# Patient Record
Sex: FEMALE | Race: WHITE | Employment: UNEMPLOYED | URBAN - METROPOLITAN AREA
[De-identification: names, ages, dates, MRNs, and addresses within clinical notes are randomized per-mention and may not be internally consistent; named-entity substitution may affect disease eponyms.]

---

## 2019-11-06 ENCOUNTER — APPOINTMENT (OUTPATIENT)
Dept: CT IMAGING | Age: 44
DRG: 392 | End: 2019-11-06
Payer: COMMERCIAL

## 2019-11-06 ENCOUNTER — HOSPITAL ENCOUNTER (INPATIENT)
Age: 44
LOS: 3 days | Discharge: HOME | DRG: 392 | End: 2019-11-09
Attending: EMERGENCY MEDICINE | Admitting: FAMILY MEDICINE
Payer: COMMERCIAL

## 2019-11-06 DIAGNOSIS — K57.92 DIVERTICULITIS: ICD-10-CM

## 2019-11-06 DIAGNOSIS — K57.32 DIVERTICULITIS OF COLON: Primary | ICD-10-CM

## 2019-11-06 LAB
ABSOLUTE EOS #: 0.06 K/UL (ref 0–0.44)
ABSOLUTE IMMATURE GRANULOCYTE: 0.05 K/UL (ref 0–0.3)
ABSOLUTE LYMPH #: 2.29 K/UL (ref 1.1–3.7)
ABSOLUTE MONO #: 1.03 K/UL (ref 0.1–1.2)
ALBUMIN SERPL-MCNC: 4 G/DL (ref 3.5–5.2)
ALBUMIN/GLOBULIN RATIO: 1.2 (ref 1–2.5)
ALP BLD-CCNC: 74 U/L (ref 35–104)
ALT SERPL-CCNC: 13 U/L (ref 5–33)
ANION GAP SERPL CALCULATED.3IONS-SCNC: 13 MMOL/L (ref 9–17)
AST SERPL-CCNC: 20 U/L
BASOPHILS # BLD: 1 % (ref 0–2)
BASOPHILS ABSOLUTE: 0.06 K/UL (ref 0–0.2)
BILIRUB SERPL-MCNC: 1.12 MG/DL (ref 0.3–1.2)
BILIRUBIN DIRECT: 0.29 MG/DL
BILIRUBIN URINE: NEGATIVE
BILIRUBIN, INDIRECT: 0.83 MG/DL (ref 0–1)
BUN BLDV-MCNC: 7 MG/DL (ref 6–20)
BUN/CREAT BLD: ABNORMAL (ref 9–20)
CALCIUM SERPL-MCNC: 9.3 MG/DL (ref 8.6–10.4)
CHLORIDE BLD-SCNC: 106 MMOL/L (ref 98–107)
CO2: 18 MMOL/L (ref 20–31)
COLOR: YELLOW
COMMENT UA: ABNORMAL
CREAT SERPL-MCNC: 0.58 MG/DL (ref 0.5–0.9)
DIFFERENTIAL TYPE: ABNORMAL
EOSINOPHILS RELATIVE PERCENT: 1 % (ref 1–4)
GFR AFRICAN AMERICAN: >60 ML/MIN
GFR NON-AFRICAN AMERICAN: >60 ML/MIN
GFR SERPL CREATININE-BSD FRML MDRD: ABNORMAL ML/MIN/{1.73_M2}
GFR SERPL CREATININE-BSD FRML MDRD: ABNORMAL ML/MIN/{1.73_M2}
GLOBULIN: NORMAL G/DL (ref 1.5–3.8)
GLUCOSE BLD-MCNC: 108 MG/DL (ref 70–99)
GLUCOSE URINE: NEGATIVE
HCG QUALITATIVE: NEGATIVE
HCT VFR BLD CALC: 42 % (ref 36.3–47.1)
HEMOGLOBIN: 14 G/DL (ref 11.9–15.1)
IMMATURE GRANULOCYTES: 0 %
KETONES, URINE: NEGATIVE
LEUKOCYTE ESTERASE, URINE: NEGATIVE
LIPASE: 13 U/L (ref 13–60)
LYMPHOCYTES # BLD: 18 % (ref 24–43)
MCH RBC QN AUTO: 30.6 PG (ref 25.2–33.5)
MCHC RBC AUTO-ENTMCNC: 33.3 G/DL (ref 28.4–34.8)
MCV RBC AUTO: 91.7 FL (ref 82.6–102.9)
MONOCYTES # BLD: 8 % (ref 3–12)
NITRITE, URINE: NEGATIVE
NRBC AUTOMATED: 0 PER 100 WBC
PDW BLD-RTO: 12.9 % (ref 11.8–14.4)
PH UA: 6.5 (ref 5–8)
PLATELET # BLD: 265 K/UL (ref 138–453)
PLATELET ESTIMATE: ABNORMAL
PMV BLD AUTO: 9.9 FL (ref 8.1–13.5)
POTASSIUM SERPL-SCNC: 3.6 MMOL/L (ref 3.7–5.3)
PROTEIN UA: NEGATIVE
RBC # BLD: 4.58 M/UL (ref 3.95–5.11)
RBC # BLD: ABNORMAL 10*6/UL
SEG NEUTROPHILS: 73 % (ref 36–65)
SEGMENTED NEUTROPHILS ABSOLUTE COUNT: 9.39 K/UL (ref 1.5–8.1)
SODIUM BLD-SCNC: 137 MMOL/L (ref 135–144)
SPECIFIC GRAVITY UA: 1 (ref 1–1.03)
TOTAL PROTEIN: 7.4 G/DL (ref 6.4–8.3)
TURBIDITY: CLEAR
URINE HGB: NEGATIVE
UROBILINOGEN, URINE: NORMAL
WBC # BLD: 12.9 K/UL (ref 3.5–11.3)
WBC # BLD: ABNORMAL 10*3/UL

## 2019-11-06 PROCEDURE — 81003 URINALYSIS AUTO W/O SCOPE: CPT

## 2019-11-06 PROCEDURE — 2580000003 HC RX 258: Performed by: STUDENT IN AN ORGANIZED HEALTH CARE EDUCATION/TRAINING PROGRAM

## 2019-11-06 PROCEDURE — 80076 HEPATIC FUNCTION PANEL: CPT

## 2019-11-06 PROCEDURE — 96375 TX/PRO/DX INJ NEW DRUG ADDON: CPT

## 2019-11-06 PROCEDURE — 6360000002 HC RX W HCPCS: Performed by: EMERGENCY MEDICINE

## 2019-11-06 PROCEDURE — 99222 1ST HOSP IP/OBS MODERATE 55: CPT | Performed by: FAMILY MEDICINE

## 2019-11-06 PROCEDURE — 6360000004 HC RX CONTRAST MEDICATION: Performed by: EMERGENCY MEDICINE

## 2019-11-06 PROCEDURE — 96365 THER/PROPH/DIAG IV INF INIT: CPT

## 2019-11-06 PROCEDURE — 6360000002 HC RX W HCPCS: Performed by: CLINICAL NURSE SPECIALIST

## 2019-11-06 PROCEDURE — 84703 CHORIONIC GONADOTROPIN ASSAY: CPT

## 2019-11-06 PROCEDURE — 2500000003 HC RX 250 WO HCPCS: Performed by: STUDENT IN AN ORGANIZED HEALTH CARE EDUCATION/TRAINING PROGRAM

## 2019-11-06 PROCEDURE — 74177 CT ABD & PELVIS W/CONTRAST: CPT

## 2019-11-06 PROCEDURE — 6360000002 HC RX W HCPCS: Performed by: STUDENT IN AN ORGANIZED HEALTH CARE EDUCATION/TRAINING PROGRAM

## 2019-11-06 PROCEDURE — 2580000003 HC RX 258: Performed by: EMERGENCY MEDICINE

## 2019-11-06 PROCEDURE — 83690 ASSAY OF LIPASE: CPT

## 2019-11-06 PROCEDURE — G0378 HOSPITAL OBSERVATION PER HR: HCPCS

## 2019-11-06 PROCEDURE — 99285 EMERGENCY DEPT VISIT HI MDM: CPT

## 2019-11-06 PROCEDURE — 85025 COMPLETE CBC W/AUTO DIFF WBC: CPT

## 2019-11-06 PROCEDURE — 2580000003 HC RX 258: Performed by: CLINICAL NURSE SPECIALIST

## 2019-11-06 PROCEDURE — 1200000000 HC SEMI PRIVATE

## 2019-11-06 PROCEDURE — 96376 TX/PRO/DX INJ SAME DRUG ADON: CPT

## 2019-11-06 PROCEDURE — 96367 TX/PROPH/DG ADDL SEQ IV INF: CPT

## 2019-11-06 PROCEDURE — 80048 BASIC METABOLIC PNL TOTAL CA: CPT

## 2019-11-06 RX ORDER — POTASSIUM CHLORIDE 20 MEQ/1
40 TABLET, EXTENDED RELEASE ORAL PRN
Status: DISCONTINUED | OUTPATIENT
Start: 2019-11-06 | End: 2019-11-09 | Stop reason: HOSPADM

## 2019-11-06 RX ORDER — MAGNESIUM SULFATE 1 G/100ML
1 INJECTION INTRAVENOUS PRN
Status: DISCONTINUED | OUTPATIENT
Start: 2019-11-06 | End: 2019-11-09 | Stop reason: HOSPADM

## 2019-11-06 RX ORDER — ACETAMINOPHEN 325 MG/1
650 TABLET ORAL EVERY 4 HOURS PRN
Status: DISCONTINUED | OUTPATIENT
Start: 2019-11-06 | End: 2019-11-09 | Stop reason: HOSPADM

## 2019-11-06 RX ORDER — MORPHINE SULFATE 4 MG/ML
4 INJECTION, SOLUTION INTRAMUSCULAR; INTRAVENOUS ONCE
Status: COMPLETED | OUTPATIENT
Start: 2019-11-06 | End: 2019-11-06

## 2019-11-06 RX ORDER — SODIUM CHLORIDE 0.9 % (FLUSH) 0.9 %
10 SYRINGE (ML) INJECTION PRN
Status: DISCONTINUED | OUTPATIENT
Start: 2019-11-06 | End: 2019-11-09 | Stop reason: HOSPADM

## 2019-11-06 RX ORDER — LEVOTHYROXINE AND LIOTHYRONINE 38; 9 UG/1; UG/1
60 TABLET ORAL DAILY
Status: DISCONTINUED | OUTPATIENT
Start: 2019-11-06 | End: 2019-11-09 | Stop reason: HOSPADM

## 2019-11-06 RX ORDER — METRONIDAZOLE 500 MG/1
500 TABLET ORAL ONCE
Status: DISCONTINUED | OUTPATIENT
Start: 2019-11-06 | End: 2019-11-06

## 2019-11-06 RX ORDER — 0.9 % SODIUM CHLORIDE 0.9 %
1000 INTRAVENOUS SOLUTION INTRAVENOUS ONCE
Status: COMPLETED | OUTPATIENT
Start: 2019-11-06 | End: 2019-11-06

## 2019-11-06 RX ORDER — SODIUM CHLORIDE 0.9 % (FLUSH) 0.9 %
10 SYRINGE (ML) INJECTION EVERY 12 HOURS SCHEDULED
Status: DISCONTINUED | OUTPATIENT
Start: 2019-11-06 | End: 2019-11-09 | Stop reason: HOSPADM

## 2019-11-06 RX ORDER — POTASSIUM CHLORIDE 7.45 MG/ML
10 INJECTION INTRAVENOUS PRN
Status: DISCONTINUED | OUTPATIENT
Start: 2019-11-06 | End: 2019-11-09 | Stop reason: HOSPADM

## 2019-11-06 RX ORDER — ONDANSETRON 2 MG/ML
4 INJECTION INTRAMUSCULAR; INTRAVENOUS ONCE
Status: COMPLETED | OUTPATIENT
Start: 2019-11-06 | End: 2019-11-06

## 2019-11-06 RX ORDER — SODIUM CHLORIDE 9 MG/ML
INJECTION, SOLUTION INTRAVENOUS CONTINUOUS
Status: DISCONTINUED | OUTPATIENT
Start: 2019-11-06 | End: 2019-11-09 | Stop reason: HOSPADM

## 2019-11-06 RX ORDER — ONDANSETRON 2 MG/ML
4 INJECTION INTRAMUSCULAR; INTRAVENOUS EVERY 6 HOURS PRN
Status: DISCONTINUED | OUTPATIENT
Start: 2019-11-06 | End: 2019-11-09 | Stop reason: HOSPADM

## 2019-11-06 RX ORDER — HYDROCODONE BITARTRATE AND ACETAMINOPHEN 5; 325 MG/1; MG/1
1 TABLET ORAL EVERY 4 HOURS PRN
Status: DISCONTINUED | OUTPATIENT
Start: 2019-11-06 | End: 2019-11-09 | Stop reason: HOSPADM

## 2019-11-06 RX ORDER — NICOTINE 21 MG/24HR
1 PATCH, TRANSDERMAL 24 HOURS TRANSDERMAL DAILY PRN
Status: DISCONTINUED | OUTPATIENT
Start: 2019-11-06 | End: 2019-11-09 | Stop reason: HOSPADM

## 2019-11-06 RX ORDER — MORPHINE SULFATE 2 MG/ML
2 INJECTION, SOLUTION INTRAMUSCULAR; INTRAVENOUS
Status: DISCONTINUED | OUTPATIENT
Start: 2019-11-06 | End: 2019-11-09 | Stop reason: HOSPADM

## 2019-11-06 RX ORDER — MORPHINE SULFATE 4 MG/ML
4 INJECTION, SOLUTION INTRAMUSCULAR; INTRAVENOUS
Status: DISCONTINUED | OUTPATIENT
Start: 2019-11-06 | End: 2019-11-09 | Stop reason: HOSPADM

## 2019-11-06 RX ORDER — HYDROCODONE BITARTRATE AND ACETAMINOPHEN 5; 325 MG/1; MG/1
2 TABLET ORAL EVERY 4 HOURS PRN
Status: DISCONTINUED | OUTPATIENT
Start: 2019-11-06 | End: 2019-11-09 | Stop reason: HOSPADM

## 2019-11-06 RX ORDER — KETOROLAC TROMETHAMINE 15 MG/ML
15 INJECTION, SOLUTION INTRAMUSCULAR; INTRAVENOUS ONCE
Status: COMPLETED | OUTPATIENT
Start: 2019-11-06 | End: 2019-11-06

## 2019-11-06 RX ORDER — LEVOTHYROXINE AND LIOTHYRONINE 38; 9 UG/1; UG/1
60 TABLET ORAL DAILY
Status: ON HOLD | COMMUNITY
End: 2019-11-06

## 2019-11-06 RX ADMIN — ONDANSETRON 4 MG: 2 INJECTION INTRAMUSCULAR; INTRAVENOUS at 11:21

## 2019-11-06 RX ADMIN — MORPHINE SULFATE 4 MG: 4 INJECTION, SOLUTION INTRAMUSCULAR; INTRAVENOUS at 18:38

## 2019-11-06 RX ADMIN — MORPHINE SULFATE 4 MG: 4 INJECTION, SOLUTION INTRAMUSCULAR; INTRAVENOUS at 21:21

## 2019-11-06 RX ADMIN — METRONIDAZOLE 500 MG: 500 INJECTION, SOLUTION INTRAVENOUS at 17:00

## 2019-11-06 RX ADMIN — SODIUM CHLORIDE 1000 ML: 9 INJECTION, SOLUTION INTRAVENOUS at 11:21

## 2019-11-06 RX ADMIN — MORPHINE SULFATE 4 MG: 4 INJECTION INTRAVENOUS at 15:11

## 2019-11-06 RX ADMIN — MORPHINE SULFATE 4 MG: 4 INJECTION INTRAVENOUS at 11:21

## 2019-11-06 RX ADMIN — MORPHINE SULFATE 4 MG: 4 INJECTION, SOLUTION INTRAMUSCULAR; INTRAVENOUS at 23:31

## 2019-11-06 RX ADMIN — IOHEXOL 75 ML: 350 INJECTION, SOLUTION INTRAVENOUS at 14:32

## 2019-11-06 RX ADMIN — AMPICILLIN SODIUM AND SULBACTAM SODIUM 1.5 G: 1; .5 INJECTION, POWDER, FOR SOLUTION INTRAMUSCULAR; INTRAVENOUS at 16:43

## 2019-11-06 RX ADMIN — KETOROLAC TROMETHAMINE 15 MG: 15 INJECTION, SOLUTION INTRAMUSCULAR; INTRAVENOUS at 15:11

## 2019-11-06 RX ADMIN — ONDANSETRON 4 MG: 2 INJECTION INTRAMUSCULAR; INTRAVENOUS at 15:25

## 2019-11-06 RX ADMIN — SODIUM CHLORIDE: 9 INJECTION, SOLUTION INTRAVENOUS at 18:38

## 2019-11-06 RX ADMIN — SODIUM CHLORIDE: 9 INJECTION, SOLUTION INTRAVENOUS at 23:10

## 2019-11-06 RX ADMIN — MORPHINE SULFATE 4 MG: 4 INJECTION INTRAVENOUS at 13:28

## 2019-11-06 ASSESSMENT — ENCOUNTER SYMPTOMS
EYE PAIN: 0
RHINORRHEA: 0
COLOR CHANGE: 0
EYE DISCHARGE: 0
BACK PAIN: 0
DIARRHEA: 0
COUGH: 0
SHORTNESS OF BREATH: 0
NAUSEA: 1
VOMITING: 1
SORE THROAT: 0
ABDOMINAL PAIN: 1

## 2019-11-06 ASSESSMENT — PAIN DESCRIPTION - DESCRIPTORS
DESCRIPTORS: DISCOMFORT

## 2019-11-06 ASSESSMENT — PAIN DESCRIPTION - ORIENTATION
ORIENTATION: LEFT;LOWER

## 2019-11-06 ASSESSMENT — PAIN DESCRIPTION - ONSET
ONSET: ON-GOING
ONSET: ON-GOING

## 2019-11-06 ASSESSMENT — PAIN SCALES - GENERAL
PAINLEVEL_OUTOF10: 4
PAINLEVEL_OUTOF10: 8
PAINLEVEL_OUTOF10: 7
PAINLEVEL_OUTOF10: 8
PAINLEVEL_OUTOF10: 7
PAINLEVEL_OUTOF10: 8
PAINLEVEL_OUTOF10: 8
PAINLEVEL_OUTOF10: 2

## 2019-11-06 ASSESSMENT — PAIN DESCRIPTION - PAIN TYPE
TYPE: ACUTE PAIN

## 2019-11-06 ASSESSMENT — PAIN DESCRIPTION - PROGRESSION
CLINICAL_PROGRESSION: NOT CHANGED
CLINICAL_PROGRESSION: NOT CHANGED

## 2019-11-06 ASSESSMENT — PAIN DESCRIPTION - LOCATION
LOCATION: ABDOMEN

## 2019-11-06 ASSESSMENT — PAIN - FUNCTIONAL ASSESSMENT
PAIN_FUNCTIONAL_ASSESSMENT: ACTIVITIES ARE NOT PREVENTED
PAIN_FUNCTIONAL_ASSESSMENT: ACTIVITIES ARE NOT PREVENTED

## 2019-11-06 ASSESSMENT — PAIN DESCRIPTION - FREQUENCY
FREQUENCY: CONTINUOUS
FREQUENCY: CONTINUOUS

## 2019-11-07 LAB
ALBUMIN SERPL-MCNC: 3.2 G/DL (ref 3.5–5.2)
ALBUMIN/GLOBULIN RATIO: 1.1 (ref 1–2.5)
ALP BLD-CCNC: 53 U/L (ref 35–104)
ALT SERPL-CCNC: 12 U/L (ref 5–33)
ANION GAP SERPL CALCULATED.3IONS-SCNC: 11 MMOL/L (ref 9–17)
AST SERPL-CCNC: 24 U/L
BILIRUB SERPL-MCNC: 1.12 MG/DL (ref 0.3–1.2)
BUN BLDV-MCNC: 5 MG/DL (ref 6–20)
BUN/CREAT BLD: ABNORMAL (ref 9–20)
CALCIUM SERPL-MCNC: 8.4 MG/DL (ref 8.6–10.4)
CHLORIDE BLD-SCNC: 109 MMOL/L (ref 98–107)
CO2: 19 MMOL/L (ref 20–31)
CREAT SERPL-MCNC: 0.55 MG/DL (ref 0.5–0.9)
GFR AFRICAN AMERICAN: >60 ML/MIN
GFR NON-AFRICAN AMERICAN: >60 ML/MIN
GFR SERPL CREATININE-BSD FRML MDRD: ABNORMAL ML/MIN/{1.73_M2}
GFR SERPL CREATININE-BSD FRML MDRD: ABNORMAL ML/MIN/{1.73_M2}
GLUCOSE BLD-MCNC: 94 MG/DL (ref 70–99)
HCT VFR BLD CALC: 37.7 % (ref 36.3–47.1)
HEMOGLOBIN: 11.8 G/DL (ref 11.9–15.1)
INR BLD: 1.1
MCH RBC QN AUTO: 30.7 PG (ref 25.2–33.5)
MCHC RBC AUTO-ENTMCNC: 31.3 G/DL (ref 28.4–34.8)
MCV RBC AUTO: 98.2 FL (ref 82.6–102.9)
NRBC AUTOMATED: 0 PER 100 WBC
PDW BLD-RTO: 13.2 % (ref 11.8–14.4)
PLATELET # BLD: 181 K/UL (ref 138–453)
PMV BLD AUTO: 9.9 FL (ref 8.1–13.5)
POTASSIUM SERPL-SCNC: 4.1 MMOL/L (ref 3.7–5.3)
PROTHROMBIN TIME: 11.1 SEC (ref 9–12)
RBC # BLD: 3.84 M/UL (ref 3.95–5.11)
SODIUM BLD-SCNC: 139 MMOL/L (ref 135–144)
TOTAL PROTEIN: 6.2 G/DL (ref 6.4–8.3)
WBC # BLD: 9.9 K/UL (ref 3.5–11.3)

## 2019-11-07 PROCEDURE — G0378 HOSPITAL OBSERVATION PER HR: HCPCS

## 2019-11-07 PROCEDURE — 85610 PROTHROMBIN TIME: CPT

## 2019-11-07 PROCEDURE — 36415 COLL VENOUS BLD VENIPUNCTURE: CPT

## 2019-11-07 PROCEDURE — 6360000002 HC RX W HCPCS: Performed by: INTERNAL MEDICINE

## 2019-11-07 PROCEDURE — 85027 COMPLETE CBC AUTOMATED: CPT

## 2019-11-07 PROCEDURE — 80053 COMPREHEN METABOLIC PANEL: CPT

## 2019-11-07 PROCEDURE — 1200000000 HC SEMI PRIVATE

## 2019-11-07 PROCEDURE — 96367 TX/PROPH/DG ADDL SEQ IV INF: CPT

## 2019-11-07 PROCEDURE — 2500000003 HC RX 250 WO HCPCS: Performed by: INTERNAL MEDICINE

## 2019-11-07 PROCEDURE — 6360000002 HC RX W HCPCS: Performed by: CLINICAL NURSE SPECIALIST

## 2019-11-07 PROCEDURE — 99232 SBSQ HOSP IP/OBS MODERATE 35: CPT | Performed by: INTERNAL MEDICINE

## 2019-11-07 PROCEDURE — 96366 THER/PROPH/DIAG IV INF ADDON: CPT

## 2019-11-07 PROCEDURE — 96376 TX/PRO/DX INJ SAME DRUG ADON: CPT

## 2019-11-07 PROCEDURE — 2580000003 HC RX 258: Performed by: CLINICAL NURSE SPECIALIST

## 2019-11-07 RX ORDER — CIPROFLOXACIN 2 MG/ML
400 INJECTION, SOLUTION INTRAVENOUS EVERY 12 HOURS
Status: DISCONTINUED | OUTPATIENT
Start: 2019-11-07 | End: 2019-11-09 | Stop reason: HOSPADM

## 2019-11-07 RX ADMIN — MORPHINE SULFATE 4 MG: 4 INJECTION, SOLUTION INTRAMUSCULAR; INTRAVENOUS at 15:22

## 2019-11-07 RX ADMIN — MORPHINE SULFATE 4 MG: 4 INJECTION, SOLUTION INTRAMUSCULAR; INTRAVENOUS at 20:20

## 2019-11-07 RX ADMIN — MORPHINE SULFATE 4 MG: 4 INJECTION, SOLUTION INTRAMUSCULAR; INTRAVENOUS at 12:29

## 2019-11-07 RX ADMIN — SODIUM CHLORIDE: 9 INJECTION, SOLUTION INTRAVENOUS at 23:03

## 2019-11-07 RX ADMIN — MORPHINE SULFATE 4 MG: 4 INJECTION, SOLUTION INTRAMUSCULAR; INTRAVENOUS at 02:44

## 2019-11-07 RX ADMIN — METRONIDAZOLE 500 MG: 500 INJECTION, SOLUTION INTRAVENOUS at 10:03

## 2019-11-07 RX ADMIN — MORPHINE SULFATE 4 MG: 4 INJECTION, SOLUTION INTRAMUSCULAR; INTRAVENOUS at 17:45

## 2019-11-07 RX ADMIN — MORPHINE SULFATE 4 MG: 4 INJECTION, SOLUTION INTRAMUSCULAR; INTRAVENOUS at 06:02

## 2019-11-07 RX ADMIN — METRONIDAZOLE 500 MG: 500 INJECTION, SOLUTION INTRAVENOUS at 17:46

## 2019-11-07 RX ADMIN — ONDANSETRON 4 MG: 2 INJECTION INTRAMUSCULAR; INTRAVENOUS at 23:03

## 2019-11-07 RX ADMIN — SODIUM CHLORIDE: 9 INJECTION, SOLUTION INTRAVENOUS at 09:06

## 2019-11-07 RX ADMIN — CIPROFLOXACIN 400 MG: 2 INJECTION, SOLUTION INTRAVENOUS at 11:22

## 2019-11-07 RX ADMIN — CIPROFLOXACIN 400 MG: 2 INJECTION, SOLUTION INTRAVENOUS at 23:04

## 2019-11-07 RX ADMIN — MORPHINE SULFATE 4 MG: 4 INJECTION, SOLUTION INTRAMUSCULAR; INTRAVENOUS at 08:40

## 2019-11-07 ASSESSMENT — PAIN - FUNCTIONAL ASSESSMENT
PAIN_FUNCTIONAL_ASSESSMENT: ACTIVITIES ARE NOT PREVENTED

## 2019-11-07 ASSESSMENT — PAIN DESCRIPTION - FREQUENCY
FREQUENCY: CONTINUOUS

## 2019-11-07 ASSESSMENT — PAIN SCALES - GENERAL
PAINLEVEL_OUTOF10: 7
PAINLEVEL_OUTOF10: 6
PAINLEVEL_OUTOF10: 6
PAINLEVEL_OUTOF10: 8
PAINLEVEL_OUTOF10: 7
PAINLEVEL_OUTOF10: 6
PAINLEVEL_OUTOF10: 7
PAINLEVEL_OUTOF10: 4
PAINLEVEL_OUTOF10: 5
PAINLEVEL_OUTOF10: 7

## 2019-11-07 ASSESSMENT — PAIN DESCRIPTION - DESCRIPTORS
DESCRIPTORS: CONSTANT
DESCRIPTORS: DISCOMFORT
DESCRIPTORS: CONSTANT
DESCRIPTORS: DISCOMFORT

## 2019-11-07 ASSESSMENT — PAIN DESCRIPTION - LOCATION
LOCATION: ABDOMEN

## 2019-11-07 ASSESSMENT — PAIN DESCRIPTION - ONSET
ONSET: ON-GOING

## 2019-11-07 ASSESSMENT — PAIN DESCRIPTION - ORIENTATION
ORIENTATION: LEFT;LOWER

## 2019-11-07 ASSESSMENT — PAIN DESCRIPTION - PROGRESSION
CLINICAL_PROGRESSION: NOT CHANGED
CLINICAL_PROGRESSION: GRADUALLY IMPROVING
CLINICAL_PROGRESSION: GRADUALLY IMPROVING
CLINICAL_PROGRESSION: NOT CHANGED

## 2019-11-07 ASSESSMENT — PAIN DESCRIPTION - PAIN TYPE
TYPE: ACUTE PAIN

## 2019-11-08 PROCEDURE — 6360000002 HC RX W HCPCS: Performed by: CLINICAL NURSE SPECIALIST

## 2019-11-08 PROCEDURE — 1200000000 HC SEMI PRIVATE

## 2019-11-08 PROCEDURE — 2500000003 HC RX 250 WO HCPCS: Performed by: INTERNAL MEDICINE

## 2019-11-08 PROCEDURE — 96375 TX/PRO/DX INJ NEW DRUG ADDON: CPT

## 2019-11-08 PROCEDURE — 6360000002 HC RX W HCPCS: Performed by: NURSE PRACTITIONER

## 2019-11-08 PROCEDURE — G0378 HOSPITAL OBSERVATION PER HR: HCPCS

## 2019-11-08 PROCEDURE — 96376 TX/PRO/DX INJ SAME DRUG ADON: CPT

## 2019-11-08 PROCEDURE — 99232 SBSQ HOSP IP/OBS MODERATE 35: CPT | Performed by: INTERNAL MEDICINE

## 2019-11-08 PROCEDURE — 2580000003 HC RX 258: Performed by: SURGERY

## 2019-11-08 PROCEDURE — 6360000002 HC RX W HCPCS: Performed by: INTERNAL MEDICINE

## 2019-11-08 PROCEDURE — 96366 THER/PROPH/DIAG IV INF ADDON: CPT

## 2019-11-08 RX ORDER — DIPHENHYDRAMINE HYDROCHLORIDE 50 MG/ML
25 INJECTION INTRAMUSCULAR; INTRAVENOUS ONCE
Status: COMPLETED | OUTPATIENT
Start: 2019-11-08 | End: 2019-11-08

## 2019-11-08 RX ADMIN — METRONIDAZOLE 500 MG: 500 INJECTION, SOLUTION INTRAVENOUS at 10:55

## 2019-11-08 RX ADMIN — MORPHINE SULFATE 4 MG: 4 INJECTION, SOLUTION INTRAMUSCULAR; INTRAVENOUS at 00:25

## 2019-11-08 RX ADMIN — MORPHINE SULFATE 4 MG: 4 INJECTION, SOLUTION INTRAMUSCULAR; INTRAVENOUS at 10:58

## 2019-11-08 RX ADMIN — CIPROFLOXACIN 400 MG: 2 INJECTION, SOLUTION INTRAVENOUS at 21:37

## 2019-11-08 RX ADMIN — METRONIDAZOLE 500 MG: 500 INJECTION, SOLUTION INTRAVENOUS at 02:04

## 2019-11-08 RX ADMIN — MORPHINE SULFATE 4 MG: 4 INJECTION, SOLUTION INTRAMUSCULAR; INTRAVENOUS at 15:09

## 2019-11-08 RX ADMIN — MORPHINE SULFATE 4 MG: 4 INJECTION, SOLUTION INTRAMUSCULAR; INTRAVENOUS at 21:37

## 2019-11-08 RX ADMIN — MORPHINE SULFATE 4 MG: 4 INJECTION, SOLUTION INTRAMUSCULAR; INTRAVENOUS at 08:11

## 2019-11-08 RX ADMIN — DIPHENHYDRAMINE HYDROCHLORIDE 25 MG: 50 INJECTION, SOLUTION INTRAMUSCULAR; INTRAVENOUS at 01:17

## 2019-11-08 RX ADMIN — METRONIDAZOLE 500 MG: 500 INJECTION, SOLUTION INTRAVENOUS at 18:14

## 2019-11-08 RX ADMIN — CIPROFLOXACIN 400 MG: 2 INJECTION, SOLUTION INTRAVENOUS at 12:02

## 2019-11-08 RX ADMIN — SODIUM CHLORIDE: 9 INJECTION, SOLUTION INTRAVENOUS at 15:09

## 2019-11-08 RX ADMIN — MORPHINE SULFATE 4 MG: 4 INJECTION, SOLUTION INTRAMUSCULAR; INTRAVENOUS at 04:27

## 2019-11-08 RX ADMIN — MORPHINE SULFATE 4 MG: 4 INJECTION, SOLUTION INTRAMUSCULAR; INTRAVENOUS at 19:01

## 2019-11-08 ASSESSMENT — PAIN SCALES - GENERAL
PAINLEVEL_OUTOF10: 7
PAINLEVEL_OUTOF10: 4
PAINLEVEL_OUTOF10: 3
PAINLEVEL_OUTOF10: 6
PAINLEVEL_OUTOF10: 7
PAINLEVEL_OUTOF10: 6
PAINLEVEL_OUTOF10: 6
PAINLEVEL_OUTOF10: 5
PAINLEVEL_OUTOF10: 6
PAINLEVEL_OUTOF10: 7
PAINLEVEL_OUTOF10: 4
PAINLEVEL_OUTOF10: 4
PAINLEVEL_OUTOF10: 5
PAINLEVEL_OUTOF10: 6
PAINLEVEL_OUTOF10: 5

## 2019-11-08 ASSESSMENT — PAIN DESCRIPTION - PAIN TYPE
TYPE: ACUTE PAIN

## 2019-11-08 ASSESSMENT — PAIN DESCRIPTION - LOCATION
LOCATION: ABDOMEN

## 2019-11-08 ASSESSMENT — PAIN DESCRIPTION - DESCRIPTORS
DESCRIPTORS: CONSTANT

## 2019-11-08 ASSESSMENT — PAIN DESCRIPTION - ONSET
ONSET: ON-GOING

## 2019-11-08 ASSESSMENT — PAIN - FUNCTIONAL ASSESSMENT
PAIN_FUNCTIONAL_ASSESSMENT: ACTIVITIES ARE NOT PREVENTED

## 2019-11-08 ASSESSMENT — PAIN DESCRIPTION - FREQUENCY
FREQUENCY: CONTINUOUS

## 2019-11-08 ASSESSMENT — PAIN DESCRIPTION - ORIENTATION
ORIENTATION: LEFT;LOWER

## 2019-11-08 ASSESSMENT — PAIN DESCRIPTION - PROGRESSION
CLINICAL_PROGRESSION: GRADUALLY IMPROVING

## 2019-11-09 VITALS
WEIGHT: 198.85 LBS | OXYGEN SATURATION: 96 % | DIASTOLIC BLOOD PRESSURE: 76 MMHG | TEMPERATURE: 98.1 F | SYSTOLIC BLOOD PRESSURE: 123 MMHG | HEIGHT: 63 IN | RESPIRATION RATE: 22 BRPM | BODY MASS INDEX: 35.23 KG/M2 | HEART RATE: 69 BPM

## 2019-11-09 PROCEDURE — 96366 THER/PROPH/DIAG IV INF ADDON: CPT

## 2019-11-09 PROCEDURE — 99232 SBSQ HOSP IP/OBS MODERATE 35: CPT | Performed by: INTERNAL MEDICINE

## 2019-11-09 PROCEDURE — 6360000002 HC RX W HCPCS: Performed by: CLINICAL NURSE SPECIALIST

## 2019-11-09 PROCEDURE — G0378 HOSPITAL OBSERVATION PER HR: HCPCS

## 2019-11-09 PROCEDURE — 6370000000 HC RX 637 (ALT 250 FOR IP): Performed by: CLINICAL NURSE SPECIALIST

## 2019-11-09 PROCEDURE — 2500000003 HC RX 250 WO HCPCS: Performed by: INTERNAL MEDICINE

## 2019-11-09 PROCEDURE — 6360000002 HC RX W HCPCS: Performed by: INTERNAL MEDICINE

## 2019-11-09 PROCEDURE — 96376 TX/PRO/DX INJ SAME DRUG ADON: CPT

## 2019-11-09 RX ORDER — HYDROCODONE BITARTRATE AND ACETAMINOPHEN 5; 325 MG/1; MG/1
1 TABLET ORAL EVERY 6 HOURS PRN
Qty: 12 TABLET | Refills: 0 | Status: SHIPPED | OUTPATIENT
Start: 2019-11-09 | End: 2019-11-12

## 2019-11-09 RX ORDER — CIPROFLOXACIN 500 MG/1
500 TABLET, FILM COATED ORAL 2 TIMES DAILY
Qty: 20 TABLET | Refills: 0 | Status: SHIPPED | OUTPATIENT
Start: 2019-11-09 | End: 2019-11-19

## 2019-11-09 RX ORDER — METRONIDAZOLE 500 MG/1
500 TABLET ORAL 3 TIMES DAILY
Qty: 30 TABLET | Refills: 0 | Status: SHIPPED | OUTPATIENT
Start: 2019-11-09 | End: 2019-11-19

## 2019-11-09 RX ADMIN — MORPHINE SULFATE 4 MG: 4 INJECTION, SOLUTION INTRAMUSCULAR; INTRAVENOUS at 00:55

## 2019-11-09 RX ADMIN — METRONIDAZOLE 500 MG: 500 INJECTION, SOLUTION INTRAVENOUS at 01:45

## 2019-11-09 RX ADMIN — HYDROCODONE BITARTRATE AND ACETAMINOPHEN 1 TABLET: 5; 325 TABLET ORAL at 12:20

## 2019-11-09 RX ADMIN — CIPROFLOXACIN 400 MG: 2 INJECTION, SOLUTION INTRAVENOUS at 09:14

## 2019-11-09 RX ADMIN — MORPHINE SULFATE 2 MG: 2 INJECTION, SOLUTION INTRAMUSCULAR; INTRAVENOUS at 07:39

## 2019-11-09 RX ADMIN — METRONIDAZOLE 500 MG: 500 INJECTION, SOLUTION INTRAVENOUS at 10:52

## 2019-11-09 ASSESSMENT — PAIN SCALES - GENERAL
PAINLEVEL_OUTOF10: 6
PAINLEVEL_OUTOF10: 7
PAINLEVEL_OUTOF10: 4
PAINLEVEL_OUTOF10: 5
PAINLEVEL_OUTOF10: 6

## 2019-11-09 ASSESSMENT — PAIN DESCRIPTION - LOCATION: LOCATION: ABDOMEN

## 2019-11-09 ASSESSMENT — PAIN DESCRIPTION - ONSET: ONSET: ON-GOING

## 2019-11-09 ASSESSMENT — PAIN DESCRIPTION - PROGRESSION: CLINICAL_PROGRESSION: GRADUALLY IMPROVING

## 2019-11-09 ASSESSMENT — PAIN DESCRIPTION - DESCRIPTORS: DESCRIPTORS: CONSTANT

## 2019-11-09 ASSESSMENT — PAIN DESCRIPTION - PAIN TYPE: TYPE: ACUTE PAIN

## 2019-11-09 ASSESSMENT — PAIN - FUNCTIONAL ASSESSMENT: PAIN_FUNCTIONAL_ASSESSMENT: ACTIVITIES ARE NOT PREVENTED

## 2019-11-09 ASSESSMENT — PAIN DESCRIPTION - ORIENTATION: ORIENTATION: LEFT;LOWER

## 2019-11-09 ASSESSMENT — PAIN DESCRIPTION - FREQUENCY: FREQUENCY: CONTINUOUS

## 2022-05-19 ENCOUNTER — HOSPITAL ENCOUNTER (OUTPATIENT)
Age: 47
Setting detail: SPECIMEN
Discharge: HOME | End: 2022-05-19

## 2022-05-19 LAB
ALBUMIN SERPL-MCNC: 4.3 G/DL (ref 3.5–5.2)
ALBUMIN/GLOBULIN RATIO: 1.3 (ref 1–2.5)
ALP BLD-CCNC: 80 U/L (ref 35–104)
ALT SERPL-CCNC: 16 U/L (ref 5–33)
ANION GAP SERPL CALCULATED.3IONS-SCNC: 13 MMOL/L (ref 9–17)
AST SERPL-CCNC: 30 U/L
BILIRUB SERPL-MCNC: 0.62 MG/DL (ref 0.3–1.2)
BUN BLDV-MCNC: 8 MG/DL (ref 6–20)
CALCIUM SERPL-MCNC: 9.5 MG/DL (ref 8.6–10.4)
CHLORIDE BLD-SCNC: 103 MMOL/L (ref 98–107)
CO2: 22 MMOL/L (ref 20–31)
CREAT SERPL-MCNC: 0.85 MG/DL (ref 0.5–0.9)
GFR AFRICAN AMERICAN: >60 ML/MIN
GFR NON-AFRICAN AMERICAN: >60 ML/MIN
GFR SERPL CREATININE-BSD FRML MDRD: NORMAL ML/MIN/{1.73_M2}
GLUCOSE BLD-MCNC: 88 MG/DL (ref 70–99)
POTASSIUM SERPL-SCNC: 4.5 MMOL/L (ref 3.7–5.3)
SODIUM BLD-SCNC: 138 MMOL/L (ref 135–144)
T3 FREE: 3.06 PG/ML (ref 2.02–4.43)
THYROXINE, FREE: 0.92 NG/DL (ref 0.93–1.7)
TOTAL PROTEIN: 7.6 G/DL (ref 6.4–8.3)
TSH SERPL DL<=0.05 MIU/L-ACNC: 1.91 UIU/ML (ref 0.3–5)
VITAMIN D 25-HYDROXY: 28.4 NG/ML

## 2022-05-20 LAB
ESTIMATED AVERAGE GLUCOSE: 120 MG/DL
HBA1C MFR BLD: 5.8 % (ref 4–6)

## 2022-07-04 ENCOUNTER — HOSPITAL ENCOUNTER (EMERGENCY)
Age: 47
Discharge: HOME | End: 2022-07-05
Attending: EMERGENCY MEDICINE
Payer: COMMERCIAL

## 2022-07-04 DIAGNOSIS — K57.32 DIVERTICULITIS OF COLON: Primary | ICD-10-CM

## 2022-07-04 PROCEDURE — 96375 TX/PRO/DX INJ NEW DRUG ADDON: CPT

## 2022-07-04 PROCEDURE — 99285 EMERGENCY DEPT VISIT HI MDM: CPT

## 2022-07-04 PROCEDURE — 96361 HYDRATE IV INFUSION ADD-ON: CPT

## 2022-07-04 PROCEDURE — 96374 THER/PROPH/DIAG INJ IV PUSH: CPT

## 2022-07-04 PROCEDURE — 96372 THER/PROPH/DIAG INJ SC/IM: CPT

## 2022-07-05 ENCOUNTER — APPOINTMENT (OUTPATIENT)
Dept: CT IMAGING | Age: 47
End: 2022-07-05
Payer: COMMERCIAL

## 2022-07-05 VITALS
RESPIRATION RATE: 18 BRPM | TEMPERATURE: 97.9 F | SYSTOLIC BLOOD PRESSURE: 138 MMHG | DIASTOLIC BLOOD PRESSURE: 89 MMHG | OXYGEN SATURATION: 98 % | HEART RATE: 70 BPM

## 2022-07-05 LAB
ABSOLUTE EOS #: 0.08 K/UL (ref 0–0.44)
ABSOLUTE IMMATURE GRANULOCYTE: 0.04 K/UL (ref 0–0.3)
ABSOLUTE LYMPH #: 3.99 K/UL (ref 1.1–3.7)
ABSOLUTE MONO #: 0.54 K/UL (ref 0.1–1.2)
ANION GAP SERPL CALCULATED.3IONS-SCNC: 13 MMOL/L (ref 9–17)
BASOPHILS # BLD: 1 % (ref 0–2)
BASOPHILS ABSOLUTE: 0.07 K/UL (ref 0–0.2)
BILIRUBIN URINE: NEGATIVE
BUN BLDV-MCNC: 9 MG/DL (ref 6–20)
CALCIUM SERPL-MCNC: 9.5 MG/DL (ref 8.6–10.4)
CHLORIDE BLD-SCNC: 103 MMOL/L (ref 98–107)
CO2: 23 MMOL/L (ref 20–31)
COLOR: YELLOW
COMMENT UA: NORMAL
CREAT SERPL-MCNC: 0.88 MG/DL (ref 0.5–0.9)
EOSINOPHILS RELATIVE PERCENT: 1 % (ref 1–4)
GFR AFRICAN AMERICAN: >60 ML/MIN
GFR NON-AFRICAN AMERICAN: >60 ML/MIN
GFR SERPL CREATININE-BSD FRML MDRD: NORMAL ML/MIN/{1.73_M2}
GLUCOSE BLD-MCNC: 97 MG/DL (ref 70–99)
GLUCOSE URINE: NEGATIVE
HCG QUALITATIVE: NEGATIVE
HCT VFR BLD CALC: 39.6 % (ref 36.3–47.1)
HEMOGLOBIN: 13.1 G/DL (ref 11.9–15.1)
IMMATURE GRANULOCYTES: 0 %
KETONES, URINE: NEGATIVE
LEUKOCYTE ESTERASE, URINE: NEGATIVE
LIPASE: 21 U/L (ref 13–60)
LYMPHOCYTES # BLD: 40 % (ref 24–43)
MCH RBC QN AUTO: 29 PG (ref 25.2–33.5)
MCHC RBC AUTO-ENTMCNC: 33.1 G/DL (ref 28.4–34.8)
MCV RBC AUTO: 87.8 FL (ref 82.6–102.9)
MONOCYTES # BLD: 5 % (ref 3–12)
NITRITE, URINE: NEGATIVE
NRBC AUTOMATED: 0 PER 100 WBC
PDW BLD-RTO: 14.2 % (ref 11.8–14.4)
PH UA: 6.5 (ref 5–8)
PLATELET # BLD: 312 K/UL (ref 138–453)
PMV BLD AUTO: 9.7 FL (ref 8.1–13.5)
POTASSIUM SERPL-SCNC: 3.7 MMOL/L (ref 3.7–5.3)
PROTEIN UA: NEGATIVE
RBC # BLD: 4.51 M/UL (ref 3.95–5.11)
SEG NEUTROPHILS: 53 % (ref 36–65)
SEGMENTED NEUTROPHILS ABSOLUTE COUNT: 5.33 K/UL (ref 1.5–8.1)
SODIUM BLD-SCNC: 139 MMOL/L (ref 135–144)
SPECIFIC GRAVITY UA: 1.01 (ref 1–1.03)
TURBIDITY: CLEAR
URINE HGB: NEGATIVE
UROBILINOGEN, URINE: NORMAL
WBC # BLD: 10.1 K/UL (ref 3.5–11.3)

## 2022-07-05 PROCEDURE — 2580000003 HC RX 258: Performed by: STUDENT IN AN ORGANIZED HEALTH CARE EDUCATION/TRAINING PROGRAM

## 2022-07-05 PROCEDURE — 83690 ASSAY OF LIPASE: CPT

## 2022-07-05 PROCEDURE — 6370000000 HC RX 637 (ALT 250 FOR IP): Performed by: STUDENT IN AN ORGANIZED HEALTH CARE EDUCATION/TRAINING PROGRAM

## 2022-07-05 PROCEDURE — 80048 BASIC METABOLIC PNL TOTAL CA: CPT

## 2022-07-05 PROCEDURE — 6360000004 HC RX CONTRAST MEDICATION: Performed by: STUDENT IN AN ORGANIZED HEALTH CARE EDUCATION/TRAINING PROGRAM

## 2022-07-05 PROCEDURE — 74177 CT ABD & PELVIS W/CONTRAST: CPT

## 2022-07-05 PROCEDURE — 85025 COMPLETE CBC W/AUTO DIFF WBC: CPT

## 2022-07-05 PROCEDURE — 84703 CHORIONIC GONADOTROPIN ASSAY: CPT

## 2022-07-05 PROCEDURE — 6360000002 HC RX W HCPCS: Performed by: STUDENT IN AN ORGANIZED HEALTH CARE EDUCATION/TRAINING PROGRAM

## 2022-07-05 PROCEDURE — 81003 URINALYSIS AUTO W/O SCOPE: CPT

## 2022-07-05 RX ORDER — AMOXICILLIN AND CLAVULANATE POTASSIUM 875; 125 MG/1; MG/1
1 TABLET, FILM COATED ORAL EVERY 12 HOURS SCHEDULED
Status: DISCONTINUED | OUTPATIENT
Start: 2022-07-05 | End: 2022-07-05

## 2022-07-05 RX ORDER — OXYCODONE HYDROCHLORIDE 5 MG/1
5 TABLET ORAL EVERY 6 HOURS PRN
Qty: 12 TABLET | Refills: 0 | Status: SHIPPED | OUTPATIENT
Start: 2022-07-05 | End: 2022-07-08

## 2022-07-05 RX ORDER — ORPHENADRINE CITRATE 30 MG/ML
60 INJECTION INTRAMUSCULAR; INTRAVENOUS ONCE
Status: COMPLETED | OUTPATIENT
Start: 2022-07-05 | End: 2022-07-05

## 2022-07-05 RX ORDER — 0.9 % SODIUM CHLORIDE 0.9 %
1000 INTRAVENOUS SOLUTION INTRAVENOUS ONCE
Status: COMPLETED | OUTPATIENT
Start: 2022-07-05 | End: 2022-07-05

## 2022-07-05 RX ORDER — MORPHINE SULFATE 4 MG/ML
4 INJECTION, SOLUTION INTRAMUSCULAR; INTRAVENOUS ONCE
Status: COMPLETED | OUTPATIENT
Start: 2022-07-05 | End: 2022-07-05

## 2022-07-05 RX ORDER — ONDANSETRON 2 MG/ML
4 INJECTION INTRAMUSCULAR; INTRAVENOUS ONCE
Status: COMPLETED | OUTPATIENT
Start: 2022-07-05 | End: 2022-07-05

## 2022-07-05 RX ORDER — AMOXICILLIN AND CLAVULANATE POTASSIUM 875; 125 MG/1; MG/1
1 TABLET, FILM COATED ORAL ONCE
Status: COMPLETED | OUTPATIENT
Start: 2022-07-05 | End: 2022-07-05

## 2022-07-05 RX ORDER — AMOXICILLIN AND CLAVULANATE POTASSIUM 875; 125 MG/1; MG/1
1 TABLET, FILM COATED ORAL 2 TIMES DAILY
Qty: 19 TABLET | Refills: 0 | Status: SHIPPED | OUTPATIENT
Start: 2022-07-05 | End: 2022-07-15

## 2022-07-05 RX ADMIN — MORPHINE SULFATE 4 MG: 4 INJECTION INTRAVENOUS at 00:27

## 2022-07-05 RX ADMIN — ONDANSETRON 4 MG: 2 INJECTION INTRAMUSCULAR; INTRAVENOUS at 00:27

## 2022-07-05 RX ADMIN — IOPAMIDOL 75 ML: 755 INJECTION, SOLUTION INTRAVENOUS at 01:19

## 2022-07-05 RX ADMIN — ORPHENADRINE CITRATE 60 MG: 30 INJECTION INTRAMUSCULAR; INTRAVENOUS at 01:52

## 2022-07-05 RX ADMIN — SODIUM CHLORIDE 1000 ML: 9 INJECTION, SOLUTION INTRAVENOUS at 00:28

## 2022-07-05 RX ADMIN — AMOXICILLIN AND CLAVULANATE POTASSIUM 1 TABLET: 875; 125 TABLET, FILM COATED ORAL at 02:37

## 2022-07-05 ASSESSMENT — ENCOUNTER SYMPTOMS
ABDOMINAL PAIN: 1
CONSTIPATION: 1
NAUSEA: 1
EYE REDNESS: 0
SHORTNESS OF BREATH: 0
EYE PAIN: 0
COUGH: 0
TROUBLE SWALLOWING: 0
BACK PAIN: 1
RHINORRHEA: 0

## 2022-07-05 ASSESSMENT — PAIN DESCRIPTION - LOCATION: LOCATION: ABDOMEN

## 2022-07-05 ASSESSMENT — PAIN SCALES - GENERAL: PAINLEVEL_OUTOF10: 10

## 2022-07-05 ASSESSMENT — PAIN - FUNCTIONAL ASSESSMENT: PAIN_FUNCTIONAL_ASSESSMENT: ACTIVITIES ARE NOT PREVENTED

## 2022-07-05 ASSESSMENT — PAIN DESCRIPTION - ORIENTATION: ORIENTATION: LOWER

## 2022-07-05 NOTE — ED NOTES
Pt ambulates back to room unassisted with steady gate. Pt A&O x 4, on continuous monitor, does not appear in acute distress, RR even and unlabored, resting comfortably on stretcher with eyes closed and call light in reach.         Radha Wick LPN  08/39/69 9877

## 2022-07-05 NOTE — ED NOTES
Pt to CT on stretcher via [facility].        Canal F[place]on Daxa, CHAYON  17/98/87 6167

## 2022-07-05 NOTE — ED PROVIDER NOTES
Difficulty of Paying Living Expenses: Not on file   Food Insecurity:     Worried About Running Out of Food in the Last Year: Not on file    Ran Out of Food in the Last Year: Not on file   Transportation Needs:     Lack of Transportation (Medical): Not on file    Lack of Transportation (Non-Medical): Not on file   Physical Activity:     Days of Exercise per Week: Not on file    Minutes of Exercise per Session: Not on file   Stress:     Feeling of Stress : Not on file   Social Connections:     Frequency of Communication with Friends and Family: Not on file    Frequency of Social Gatherings with Friends and Family: Not on file    Attends Roman [employer] Services: Not on file    Active Member of [address] [place] [address] [employer] or Organizations: Not on file    Attends Club or Organization Meetings: Not on file    Marital Status: Not on file   Intimate Partner Violence:     Fear of Current or Ex-Partner: Not on file    Emotionally Abused: Not on file    Physically Abused: Not on file    Sexually Abused: Not on file   Housing Stability:     Unable to Pay for Housing in the Last Year: Not on file    Number of [place] in the Last Year: Not on file    Unstable Housing in the Last Year: Not on file       History reviewed. No pertinent family history. Allergies:  Trazodone and Trazodone and nefazodone    Home Medications:  Prior to Admission medications    Medication Sig Start Date End Date Taking? Authorizing Provider   amoxicillin-clavulanate (AUGMENTIN) 875-125 MG per tablet Take 1 tablet by mouth 2 times daily for 10 days 7/5/22 7/15/22 Yes Kevin Eng DO   oxyCODONE (ROXICODONE) 5 MG immediate release tablet Take 1 tablet by mouth every 6 hours as needed for Pain for up to 3 days. Intended supply: 3 days.  Take lowest dose possible to manage pain 7/5/22 7/8/22 Yes Kevin Eng DO       REVIEW OFSYSTEMS    (2-9 systems for level 4, 10 or more for level 5)      Review of Systems   Constitutional: Negative for chills and fever. HENT: Negative for congestion, rhinorrhea and trouble swallowing. Eyes: Negative for pain and redness. Respiratory: Negative for cough and shortness of breath. Cardiovascular: Negative for chest pain and palpitations. Gastrointestinal: Positive for abdominal pain, constipation and nausea. Genitourinary: Positive for flank pain. Negative for difficulty urinating, dysuria, vaginal bleeding and vaginal discharge. Musculoskeletal: Positive for back pain. Negative for neck pain. Skin: Negative for rash and wound. Neurological: Negative for dizziness and headaches. Psychiatric/Behavioral: Negative for behavioral problems and confusion. PHYSICAL EXAM   (up to 7 for level 4, 8 or more forlevel 5)      INITIAL VITALS:   Vitals:    07/05/22 0132 07/05/22 0145 07/05/22 0200 07/05/22 0215   BP:  138/86 (!) 140/93 138/89   Pulse: 78 69 68 70   Resp:  18 23 18   Temp:       TempSrc:       SpO2:  98% 96% 98%         Physical Exam  Constitutional:       General: She is not in acute distress. Appearance: Normal appearance. She is not ill-appearing. HENT:      Head: Normocephalic and atraumatic. Right Ear: External ear normal.      Left Ear: External ear normal.      Nose: Nose normal.      Mouth/Throat:      Mouth: Mucous membranes are moist.      Pharynx: No oropharyngeal exudate or posterior oropharyngeal erythema. Eyes:      General:         Right eye: No discharge. Left eye: No discharge. Extraocular Movements: Extraocular movements intact. Pupils: Pupils are equal, round, and reactive to light. Cardiovascular:      Rate and Rhythm: Normal rate and regular rhythm. Pulses: Normal pulses. Heart sounds: No murmur heard. Pulmonary:      Effort: Pulmonary effort is normal. No respiratory distress. Breath sounds: Normal breath sounds. Abdominal:      Palpations: Abdomen is soft.       Comments: Abdomen soft, nondistended, significant tenderness to palpation in the left lower quadrant, no rebound or guarding. No CVA tenderness bilaterally. Musculoskeletal:      Cervical back: Normal range of motion. No rigidity. Comments: Moving all 4 extremities. Bilateral tenderness in the lumbar paraspinal region   Skin:     General: Skin is warm. Capillary Refill: Capillary refill takes less than 2 seconds. Neurological:      General: No focal deficit present. Mental Status: She is alert and oriented to person, place, and time. Cranial Nerves: No cranial nerve deficit. Psychiatric:         Mood and Affect: Mood normal.         Behavior: Behavior normal.         DIFFERENTIAL  DIAGNOSIS     PLAN (LABS / IMAGING / EKG):  Orders Placed This Encounter   Procedures    CT ABDOMEN PELVIS W IV CONTRAST Additional Contrast? None    CBC with Auto Differential    Basic Metabolic Panel w/ Reflex to MG    Lipase    Urinalysis with Reflex to Culture    HCG Qualitative, Serum       MEDICATIONS ORDERED:  Orders Placed This Encounter   Medications    ondansetron (ZOFRAN) injection 4 mg    morphine injection 4 mg    0.9 % sodium chloride bolus    iopamidol (ISOVUE-370) 76 % injection 75 mL    orphenadrine (NORFLEX) injection 60 mg    DISCONTD: amoxicillin-clavulanate (AUGMENTIN) 875-125 MG per tablet 1 tablet     Order Specific Question:   Antimicrobial Indications     Answer:   Intra-Abdominal Infection    amoxicillin-clavulanate (AUGMENTIN) 875-125 MG per tablet     Sig: Take 1 tablet by mouth 2 times daily for 10 days     Dispense:  19 tablet     Refill:  0    oxyCODONE (ROXICODONE) 5 MG immediate release tablet     Sig: Take 1 tablet by mouth every 6 hours as needed for Pain for up to 3 days. Intended supply: 3 days.  Take lowest dose possible to manage pain     Dispense:  12 tablet     Refill:  0    amoxicillin-clavulanate (AUGMENTIN) 875-125 MG per tablet 1 tablet     Order Specific Question:   Antimicrobial Indications Answer:   Intra-Abdominal Infection       DDX: Diverticulitis, kidney stone, UTI, pyelonephritis, SBO, musculoskeletal pain    Initial MDM/Plan: 40 y.o. female who presents with left lower quadrant abdominal pain. History of diverticulitis and this feels similar. Associated constipation. Patient well-appearing initial evaluation, afebrile, vital signs stable. Tenderness to palpation in bilateral paraspinal lumbar regions as well as left lower quadrant. Abdomen nonperitoneal.  Will perform laboratory work-up. Will perform CT abdomen pelvis to further evaluate. Will provide analgesia and antiemetics. Will reassess. DIAGNOSTIC RESULTS / EMERGENCYDEPARTMENT COURSE / MDM     LABS:  Labs Reviewed   CBC WITH AUTO DIFFERENTIAL - Abnormal; Notable for the following components:       Result Value    Absolute Lymph # 3.99 (*)     All other components within normal limits   BASIC METABOLIC PANEL W/ REFLEX TO MG FOR LOW K   LIPASE   URINALYSIS WITH REFLEX TO CULTURE   HCG, SERUM, QUALITATIVE         RADIOLOGY:  CT ABDOMEN PELVIS W IV CONTRAST Additional Contrast? None    Result Date: 7/5/2022  EXAMINATION: CT OF THE ABDOMEN AND PELVIS WITH CONTRAST 7/5/2022 1:09 am TECHNIQUE: CT of the abdomen and pelvis was performed with the administration of intravenous contrast. Multiplanar reformatted images are provided for review. Automated exposure control, iterative reconstruction, and/or weight based adjustment of the mA/kV was utilized to reduce the radiation dose to as low as reasonably achievable. COMPARISON: None. HISTORY: ORDERING SYSTEM PROVIDED HISTORY: LLQ pain TECHNOLOGIST PROVIDED HISTORY: LLQ pain Decision Support Exception - unselect if not a suspected or confirmed emergency medical condition->Emergency Medical Condition (MA) Reason for Exam: llq pain FINDINGS: Lower Chest: No focal consolidation at the lung bases. The heart is not enlarged. No pericardial effusion.  Organs: Liver: Suspect underlying hepatic steatosis. No focal abnormality. Gallbladder: Unremarkable gallbladder. No biliary ductal dilatation Spleen: Unremarkable spleen. Pancreas: No peripancreatic inflammatory changes. Adrenal Glands: No focal adrenal abnormalities identified. Kidneys: No hydronephrosis. Tiny left renal cyst. GI/Bowel: Stomach: The stomach is nondistended. Small bowel: No evidence of small bowel obstruction. Colon: Inflammatory changes near this splenic flexure the colon where there diverticula favoring mild acute diverticulitis. No loculated fluid collections or free air. Appendix: Normal appearance of the appendix. Pelvis: Fluid-filled endometrial cavity. No free fluid in the pelvis. Peritoneum/Retroperitoneum: Atherosclerosis of the abdominal aorta. No retroperitoneal lymphadenopathy by CT criteria. Bones/Soft Tissues: No acute findings within the soft tissues or osseous structures. Mild acute diverticulitis of the splenic flexure of the colon. No loculated fluid collections or free air. EKG  None    All EKG's are interpreted by the Emergency Department Physicianwho either signs or Co-signs this chart in the absence of a cardiologist.    EMERGENCY DEPARTMENT COURSE:  ED Course as of 07/05/22 1957   Tue Jul 05, 2022   0129 Labs grossly unremarkable [AB]   0221 CT ABDOMEN PELVIS W IV CONTRAST Additional Contrast? None  IMPRESSION:  Mild acute diverticulitis of the splenic flexure of the colon. No loculated  fluid collections or free air. [AB]   0244 Patient reevaluated. Updated on CT findings. Patient will be discharged on oral antibiotics. Provided medication for pain. Instructed how to use. Advised to not drink alcohol or operate any heavy machinery or drive while using the narcotic pain medication. Advised return the emergency room if she develops any fevers, worsening pain, vomiting, inability tolerating oral intake, any other concerning symptoms. Advise follow-up with PCP.   Patient agreed with discharge plan at this time [AB]      ED Course User Index  [AB] Neri Mcneil DO          PROCEDURES:  None    CONSULTS:  None    CRITICAL CARE:  See attending physician note    FINAL IMPRESSION      1. Diverticulitis of colon          DISPOSITION / PLAN     DISPOSITION Decision To Discharge 07/05/2022 02:23:51 AM      PATIENT REFERRED TO:  [facility] ED  [address] [facility] [address]  [phone]  Go to   If symptoms worsen    [address]  [address] [place] [id number] [phone]  Schedule an appointment as soon as possible for a visit in 1 week        DISCHARGE MEDICATIONS:  Discharge Medication List as of 7/5/2022  2:27 AM      START taking these medications    Details   amoxicillin-clavulanate (AUGMENTIN) 875-125 MG per tablet Take 1 tablet by mouth 2 times daily for 10 days, Disp-19 tablet, R-0Print      oxyCODONE (ROXICODONE) 5 MG immediate release tablet Take 1 tablet by mouth every 6 hours as needed for Pain for up to 3 days. Intended supply: 3 days.  Take lowest dose possible to manage pain, Disp-12 tablet, R-0Print             Janee Brown DO  Emergency Medicine Resident    (Please note that portions of this note were completed with a voice recognition program.Efforts were made to edit the dictations but occasionally words are mis-transcribed.)        Neri Mcneil DO  Resident  07/05/22 9751

## 2022-07-05 NOTE — ED NOTES
Pt presents to the ED c/o LLQ abdominal pain and bilat flank pain x 2 days that has worsened. Pt states she has a hx of diverticulitis and kidney stones. Pt placed in a gown, on continuous monitor with alarms set, vitals obtained. Pt A&O x 4, does not appear in acute distress, RR even and unlabored, resting comfortably on stretcher with eyes open and call light in reach, medical hx and allergies reviewed with pt. Dr. Laura Freed at bedside to evaluate pt.  Initial assessment performed by physician, Feliciano Huang will carry out initial orders/tasks and reassess pt.           Dylan Matat LPN  25/98/75 3069

## 2022-07-05 NOTE — ED NOTES
Pt returned to room from CT on stretcher via tech. Pt A&O x 4, on continuous monitor, does not appear in acute distress, RR even and unlabored, resting comfortably on stretcher with eyes closed and call light in reach. Dr. Chas Fuller at bedside to evaluate pt.         Prashant Santos, COREY  64/91/02 8051

## 2022-07-09 NOTE — ED PROVIDER NOTES
[facility]  [facility]  Faculty Attestation       I performed a history and physical examination of the patient and discussed management with the resident. I reviewed the residents note and agree with the documented findings including all diagnostic interpretations and plan of care. Any areas of disagreement are noted on the chart. I was personally present for the key portions of any procedures. I have documented in the chart those procedures where I was not present during the key portions. I have reviewed the emergency nurses triage note. I agree with the chief complaint, past medical history, past surgical history, allergies, medications, social and family history as documented unless otherwise noted below. Documentation of the HPI, Physical Exam and Medical Decision Making performed by scribjose ramon is based on my personal performance of the HPI, PE and MDM. For Physician Assistant/ Nurse Practitioner cases/documentation I have personally evaluated this patient and have completed at least one if not all key elements of the E/M (history, physical exam, and MDM). Additional findings are as noted. Pertinent Comments     Primary Care Physician: No primary care provider on file. History: This is a 40 y.o. female who presents to the Emergency Department with complaint of abdominal pain and flank pain that started approximately 2-3 weeks ago but has progressively worsened. Moderate in nature - feels similar to prior diverticulitis flares  Associated with nausea without vomiting and constipation. Denies any vaginal symptoms. Denies any urinary symptoms. Physical:    ED Triage Vitals [07/04/22 2330]   BP Temp Temp Source Heart Rate Resp SpO2 Height Weight   (!) 158/93 97.9 °F (36.6 °C) Oral 99 16 99 % -- --        General: alert, well appearing, and in no distress. HENT: normocephalic, moist mucus membranes  Eyes: pupils equal and reactive, extraocular eye movements intact.   No scleral icterus. Neck: normal ROM, trachea midline   Heart:  normal rate, regular rhythm, no murmurs, rubs, clicks or gallops. Chest: clear to auscultation, no wheezes, rales or rhonchi, symmetric air entry. Abdomen: Soft, nondistended w/ tendernress in the LLQ with no rebound or guarding. No CVA tenderness. Extremities: no obvious deformities, normal ROM of all 4 extremities, no edema of bilateral lower extremities  Neurological: alert, oriented, normal speech, no focal findings or movement disorder noted   Skin: normal coloration and turgor, no rashes or jaundice on exposed skin     MDM/Plan:   LLQ abdominal pain with constipation. H/o diverticulitis  On exam has tenderness in the LLQ but nonperitoneal  Will check basic labs and CT abd/pelvis  Low suspicion of gynecological etiology based on symptoms but will discuss if CT imaging negative  Found to have diverticulitis  Simple in nature  Tamiko Manuela for outpatient treatment w/ antibiotics.    Return if concerrns arise      Critical Care Time: None     Stacia Simms MD  Attending Emergency Physician        Stacia Simms MD  07/09/22 3582

## 2022-07-26 ENCOUNTER — APPOINTMENT (OUTPATIENT)
Dept: CT IMAGING | Age: 47
DRG: 392 | End: 2022-07-26
Payer: COMMERCIAL

## 2022-07-26 ENCOUNTER — HOSPITAL ENCOUNTER (INPATIENT)
Age: 47
LOS: 3 days | Discharge: HOME | DRG: 392 | End: 2022-07-29
Attending: EMERGENCY MEDICINE | Admitting: INTERNAL MEDICINE
Payer: COMMERCIAL

## 2022-07-26 DIAGNOSIS — K57.32 DIVERTICULITIS OF COLON: Primary | ICD-10-CM

## 2022-07-26 PROBLEM — K57.90 DIVERTICULOSIS: Status: ACTIVE | Noted: 2022-07-26

## 2022-07-26 PROBLEM — K57.92 ACUTE DIVERTICULITIS: Status: ACTIVE | Noted: 2022-07-26

## 2022-07-26 PROBLEM — E87.6 HYPOKALEMIA: Status: ACTIVE | Noted: 2022-07-26

## 2022-07-26 PROBLEM — K57.92 DIVERTICULITIS: Status: RESOLVED | Noted: 2019-11-06 | Resolved: 2022-07-26

## 2022-07-26 LAB
-: NORMAL
ABSOLUTE EOS #: 0.07 K/UL (ref 0–0.44)
ABSOLUTE IMMATURE GRANULOCYTE: 0.05 K/UL (ref 0–0.3)
ABSOLUTE LYMPH #: 2.36 K/UL (ref 1.1–3.7)
ABSOLUTE MONO #: 0.94 K/UL (ref 0.1–1.2)
ALBUMIN SERPL-MCNC: 4.1 G/DL (ref 3.5–5.2)
ALBUMIN/GLOBULIN RATIO: 1.5 (ref 1–2.5)
ALP BLD-CCNC: 70 U/L (ref 35–104)
ALT SERPL-CCNC: 13 U/L (ref 5–33)
ANION GAP SERPL CALCULATED.3IONS-SCNC: 13 MMOL/L (ref 9–17)
AST SERPL-CCNC: 19 U/L
BASOPHILS # BLD: 0 % (ref 0–2)
BASOPHILS ABSOLUTE: 0.06 K/UL (ref 0–0.2)
BILIRUB SERPL-MCNC: 0.8 MG/DL (ref 0.3–1.2)
BILIRUBIN URINE: NEGATIVE
BUN BLDV-MCNC: 5 MG/DL (ref 6–20)
CALCIUM SERPL-MCNC: 9.1 MG/DL (ref 8.6–10.4)
CHLORIDE BLD-SCNC: 103 MMOL/L (ref 98–107)
CO2: 21 MMOL/L (ref 20–31)
COLOR: YELLOW
CREAT SERPL-MCNC: 0.63 MG/DL (ref 0.5–0.9)
EOSINOPHILS RELATIVE PERCENT: 1 % (ref 1–4)
EPITHELIAL CELLS UA: NORMAL /HPF (ref 0–5)
ESTIMATED AVERAGE GLUCOSE: 117 MG/DL
GFR AFRICAN AMERICAN: >60 ML/MIN
GFR NON-AFRICAN AMERICAN: >60 ML/MIN
GFR SERPL CREATININE-BSD FRML MDRD: ABNORMAL ML/MIN/{1.73_M2}
GLUCOSE BLD-MCNC: 113 MG/DL (ref 70–99)
GLUCOSE URINE: NEGATIVE
HBA1C MFR BLD: 5.7 % (ref 4–6)
HCG QUALITATIVE: NEGATIVE
HCT VFR BLD CALC: 38.4 % (ref 36.3–47.1)
HEMOGLOBIN: 12.5 G/DL (ref 11.9–15.1)
IMMATURE GRANULOCYTES: 0 %
KETONES, URINE: NEGATIVE
LEUKOCYTE ESTERASE, URINE: NEGATIVE
LIPASE: 21 U/L (ref 13–60)
LYMPHOCYTES # BLD: 17 % (ref 24–43)
MCH RBC QN AUTO: 27.8 PG (ref 25.2–33.5)
MCHC RBC AUTO-ENTMCNC: 32.6 G/DL (ref 28.4–34.8)
MCV RBC AUTO: 85.5 FL (ref 82.6–102.9)
MONOCYTES # BLD: 7 % (ref 3–12)
NITRITE, URINE: NEGATIVE
NRBC AUTOMATED: 0 PER 100 WBC
PDW BLD-RTO: 14.6 % (ref 11.8–14.4)
PH UA: 6.5 (ref 5–8)
PLATELET # BLD: 252 K/UL (ref 138–453)
PMV BLD AUTO: 9.4 FL (ref 8.1–13.5)
POTASSIUM SERPL-SCNC: 3.4 MMOL/L (ref 3.7–5.3)
PROTEIN UA: NEGATIVE
RBC # BLD: 4.49 M/UL (ref 3.95–5.11)
RBC # BLD: ABNORMAL 10*6/UL
RBC UA: NORMAL /HPF (ref 0–4)
SEG NEUTROPHILS: 75 % (ref 36–65)
SEGMENTED NEUTROPHILS ABSOLUTE COUNT: 10.09 K/UL (ref 1.5–8.1)
SODIUM BLD-SCNC: 137 MMOL/L (ref 135–144)
SPECIFIC GRAVITY UA: 1.01 (ref 1–1.03)
TOTAL PROTEIN: 6.9 G/DL (ref 6.4–8.3)
TSH SERPL DL<=0.05 MIU/L-ACNC: 2.26 UIU/ML (ref 0.3–5)
TURBIDITY: CLEAR
URINE HGB: ABNORMAL
UROBILINOGEN, URINE: NORMAL
VITAMIN D 25-HYDROXY: 27.7 NG/ML
WBC # BLD: 13.6 K/UL (ref 3.5–11.3)
WBC UA: NORMAL /HPF (ref 0–5)

## 2022-07-26 PROCEDURE — 2500000003 HC RX 250 WO HCPCS: Performed by: STUDENT IN AN ORGANIZED HEALTH CARE EDUCATION/TRAINING PROGRAM

## 2022-07-26 PROCEDURE — 1200000000 HC SEMI PRIVATE

## 2022-07-26 PROCEDURE — 2580000003 HC RX 258

## 2022-07-26 PROCEDURE — 82306 VITAMIN D 25 HYDROXY: CPT

## 2022-07-26 PROCEDURE — 6360000002 HC RX W HCPCS: Performed by: STUDENT IN AN ORGANIZED HEALTH CARE EDUCATION/TRAINING PROGRAM

## 2022-07-26 PROCEDURE — 2500000003 HC RX 250 WO HCPCS

## 2022-07-26 PROCEDURE — 96376 TX/PRO/DX INJ SAME DRUG ADON: CPT

## 2022-07-26 PROCEDURE — 85025 COMPLETE CBC W/AUTO DIFF WBC: CPT

## 2022-07-26 PROCEDURE — 2580000003 HC RX 258: Performed by: STUDENT IN AN ORGANIZED HEALTH CARE EDUCATION/TRAINING PROGRAM

## 2022-07-26 PROCEDURE — 96374 THER/PROPH/DIAG INJ IV PUSH: CPT

## 2022-07-26 PROCEDURE — 96375 TX/PRO/DX INJ NEW DRUG ADDON: CPT

## 2022-07-26 PROCEDURE — 6360000002 HC RX W HCPCS

## 2022-07-26 PROCEDURE — 99285 EMERGENCY DEPT VISIT HI MDM: CPT

## 2022-07-26 PROCEDURE — 80053 COMPREHEN METABOLIC PANEL: CPT

## 2022-07-26 PROCEDURE — 83690 ASSAY OF LIPASE: CPT

## 2022-07-26 PROCEDURE — 6370000000 HC RX 637 (ALT 250 FOR IP)

## 2022-07-26 PROCEDURE — 84443 ASSAY THYROID STIM HORMONE: CPT

## 2022-07-26 PROCEDURE — 84703 CHORIONIC GONADOTROPIN ASSAY: CPT

## 2022-07-26 PROCEDURE — 6360000004 HC RX CONTRAST MEDICATION: Performed by: STUDENT IN AN ORGANIZED HEALTH CARE EDUCATION/TRAINING PROGRAM

## 2022-07-26 PROCEDURE — 74177 CT ABD & PELVIS W/CONTRAST: CPT

## 2022-07-26 PROCEDURE — 83036 HEMOGLOBIN GLYCOSYLATED A1C: CPT

## 2022-07-26 PROCEDURE — 6360000002 HC RX W HCPCS: Performed by: EMERGENCY MEDICINE

## 2022-07-26 PROCEDURE — 81001 URINALYSIS AUTO W/SCOPE: CPT

## 2022-07-26 PROCEDURE — 99223 1ST HOSP IP/OBS HIGH 75: CPT | Performed by: INTERNAL MEDICINE

## 2022-07-26 RX ORDER — POTASSIUM CHLORIDE 7.45 MG/ML
10 INJECTION INTRAVENOUS PRN
Status: DISCONTINUED | OUTPATIENT
Start: 2022-07-26 | End: 2022-07-29 | Stop reason: HOSPADM

## 2022-07-26 RX ORDER — MAGNESIUM SULFATE IN WATER 40 MG/ML
2000 INJECTION, SOLUTION INTRAVENOUS PRN
Status: DISCONTINUED | OUTPATIENT
Start: 2022-07-26 | End: 2022-07-29 | Stop reason: HOSPADM

## 2022-07-26 RX ORDER — ACETAMINOPHEN 325 MG/1
650 TABLET ORAL EVERY 6 HOURS PRN
Status: DISCONTINUED | OUTPATIENT
Start: 2022-07-26 | End: 2022-07-29 | Stop reason: HOSPADM

## 2022-07-26 RX ORDER — ONDANSETRON 2 MG/ML
4 INJECTION INTRAMUSCULAR; INTRAVENOUS ONCE
Status: COMPLETED | OUTPATIENT
Start: 2022-07-26 | End: 2022-07-26

## 2022-07-26 RX ORDER — SODIUM CHLORIDE 0.9 % (FLUSH) 0.9 %
5-40 SYRINGE (ML) INJECTION EVERY 12 HOURS SCHEDULED
Status: DISCONTINUED | OUTPATIENT
Start: 2022-07-26 | End: 2022-07-29 | Stop reason: HOSPADM

## 2022-07-26 RX ORDER — POTASSIUM CHLORIDE 20 MEQ/1
40 TABLET, EXTENDED RELEASE ORAL ONCE
Status: COMPLETED | OUTPATIENT
Start: 2022-07-26 | End: 2022-07-26

## 2022-07-26 RX ORDER — FENTANYL CITRATE 50 UG/ML
50 INJECTION, SOLUTION INTRAMUSCULAR; INTRAVENOUS ONCE
Status: COMPLETED | OUTPATIENT
Start: 2022-07-26 | End: 2022-07-26

## 2022-07-26 RX ORDER — SODIUM CHLORIDE 9 MG/ML
INJECTION, SOLUTION INTRAVENOUS CONTINUOUS
Status: DISCONTINUED | OUTPATIENT
Start: 2022-07-26 | End: 2022-07-29 | Stop reason: HOSPADM

## 2022-07-26 RX ORDER — METRONIDAZOLE 500 MG/100ML
500 INJECTION, SOLUTION INTRAVENOUS ONCE
Status: COMPLETED | OUTPATIENT
Start: 2022-07-26 | End: 2022-07-26

## 2022-07-26 RX ORDER — POTASSIUM CHLORIDE 7.45 MG/ML
40 INJECTION INTRAVENOUS ONCE
Status: DISCONTINUED | OUTPATIENT
Start: 2022-07-26 | End: 2022-07-26

## 2022-07-26 RX ORDER — PANTOPRAZOLE SODIUM 40 MG/1
40 TABLET, DELAYED RELEASE ORAL
Status: DISCONTINUED | OUTPATIENT
Start: 2022-07-27 | End: 2022-07-29 | Stop reason: HOSPADM

## 2022-07-26 RX ORDER — ACETAMINOPHEN 650 MG/1
650 SUPPOSITORY RECTAL EVERY 6 HOURS PRN
Status: DISCONTINUED | OUTPATIENT
Start: 2022-07-26 | End: 2022-07-29 | Stop reason: HOSPADM

## 2022-07-26 RX ORDER — 0.9 % SODIUM CHLORIDE 0.9 %
1000 INTRAVENOUS SOLUTION INTRAVENOUS ONCE
Status: COMPLETED | OUTPATIENT
Start: 2022-07-26 | End: 2022-07-26

## 2022-07-26 RX ORDER — LEVOTHYROXINE AND LIOTHYRONINE 38; 9 UG/1; UG/1
60 TABLET ORAL
Status: DISCONTINUED | OUTPATIENT
Start: 2022-07-27 | End: 2022-07-29 | Stop reason: HOSPADM

## 2022-07-26 RX ORDER — CIPROFLOXACIN 2 MG/ML
400 INJECTION, SOLUTION INTRAVENOUS ONCE
Status: COMPLETED | OUTPATIENT
Start: 2022-07-26 | End: 2022-07-26

## 2022-07-26 RX ORDER — CIPROFLOXACIN 2 MG/ML
400 INJECTION, SOLUTION INTRAVENOUS EVERY 12 HOURS
Status: DISCONTINUED | OUTPATIENT
Start: 2022-07-26 | End: 2022-07-29 | Stop reason: HOSPADM

## 2022-07-26 RX ORDER — ENOXAPARIN SODIUM 100 MG/ML
40 INJECTION SUBCUTANEOUS DAILY
Status: DISCONTINUED | OUTPATIENT
Start: 2022-07-26 | End: 2022-07-29 | Stop reason: HOSPADM

## 2022-07-26 RX ORDER — SODIUM CHLORIDE 9 MG/ML
INJECTION, SOLUTION INTRAVENOUS PRN
Status: DISCONTINUED | OUTPATIENT
Start: 2022-07-26 | End: 2022-07-29 | Stop reason: HOSPADM

## 2022-07-26 RX ORDER — SODIUM CHLORIDE 0.9 % (FLUSH) 0.9 %
5-40 SYRINGE (ML) INJECTION PRN
Status: DISCONTINUED | OUTPATIENT
Start: 2022-07-26 | End: 2022-07-29 | Stop reason: HOSPADM

## 2022-07-26 RX ORDER — POLYETHYLENE GLYCOL 3350 17 G/17G
17 POWDER, FOR SOLUTION ORAL DAILY PRN
Status: DISCONTINUED | OUTPATIENT
Start: 2022-07-26 | End: 2022-07-29 | Stop reason: HOSPADM

## 2022-07-26 RX ORDER — MORPHINE SULFATE 4 MG/ML
2 INJECTION, SOLUTION INTRAMUSCULAR; INTRAVENOUS EVERY 4 HOURS PRN
Status: DISCONTINUED | OUTPATIENT
Start: 2022-07-26 | End: 2022-07-29 | Stop reason: HOSPADM

## 2022-07-26 RX ORDER — LEVOTHYROXINE AND LIOTHYRONINE 38; 9 UG/1; UG/1
60 TABLET ORAL DAILY
COMMUNITY

## 2022-07-26 RX ORDER — ONDANSETRON 4 MG/1
4 TABLET, ORALLY DISINTEGRATING ORAL EVERY 8 HOURS PRN
Status: DISCONTINUED | OUTPATIENT
Start: 2022-07-26 | End: 2022-07-29 | Stop reason: HOSPADM

## 2022-07-26 RX ORDER — ONDANSETRON 2 MG/ML
4 INJECTION INTRAMUSCULAR; INTRAVENOUS EVERY 6 HOURS PRN
Status: DISCONTINUED | OUTPATIENT
Start: 2022-07-26 | End: 2022-07-29 | Stop reason: HOSPADM

## 2022-07-26 RX ADMIN — FENTANYL CITRATE 50 MCG: 50 INJECTION, SOLUTION INTRAMUSCULAR; INTRAVENOUS at 10:18

## 2022-07-26 RX ADMIN — MORPHINE SULFATE 2 MG: 4 INJECTION, SOLUTION INTRAMUSCULAR; INTRAVENOUS at 21:57

## 2022-07-26 RX ADMIN — FENTANYL CITRATE 50 MCG: 50 INJECTION, SOLUTION INTRAMUSCULAR; INTRAVENOUS at 09:03

## 2022-07-26 RX ADMIN — SODIUM CHLORIDE 1000 ML: 9 INJECTION, SOLUTION INTRAVENOUS at 09:03

## 2022-07-26 RX ADMIN — POTASSIUM CHLORIDE 40 MEQ: 1500 TABLET, EXTENDED RELEASE ORAL at 13:37

## 2022-07-26 RX ADMIN — ONDANSETRON 4 MG: 2 INJECTION INTRAMUSCULAR; INTRAVENOUS at 09:03

## 2022-07-26 RX ADMIN — METRONIDAZOLE 500 MG: 500 INJECTION, SOLUTION INTRAVENOUS at 11:47

## 2022-07-26 RX ADMIN — MORPHINE SULFATE 2 MG: 4 INJECTION, SOLUTION INTRAMUSCULAR; INTRAVENOUS at 13:38

## 2022-07-26 RX ADMIN — ONDANSETRON 4 MG: 2 INJECTION INTRAMUSCULAR; INTRAVENOUS at 17:43

## 2022-07-26 RX ADMIN — CIPROFLOXACIN 400 MG: 2 INJECTION, SOLUTION INTRAVENOUS at 22:50

## 2022-07-26 RX ADMIN — CIPROFLOXACIN 400 MG: 2 INJECTION, SOLUTION INTRAVENOUS at 10:34

## 2022-07-26 RX ADMIN — METRONIDAZOLE 500 MG: 500 INJECTION, SOLUTION INTRAVENOUS at 21:14

## 2022-07-26 RX ADMIN — IOPAMIDOL 75 ML: 755 INJECTION, SOLUTION INTRAVENOUS at 09:30

## 2022-07-26 RX ADMIN — MORPHINE SULFATE 2 MG: 4 INJECTION, SOLUTION INTRAMUSCULAR; INTRAVENOUS at 17:47

## 2022-07-26 RX ADMIN — SODIUM CHLORIDE: 9 INJECTION, SOLUTION INTRAVENOUS at 17:41

## 2022-07-26 ASSESSMENT — PAIN - FUNCTIONAL ASSESSMENT
PAIN_FUNCTIONAL_ASSESSMENT: ACTIVITIES ARE NOT PREVENTED
PAIN_FUNCTIONAL_ASSESSMENT: 0-10

## 2022-07-26 ASSESSMENT — ENCOUNTER SYMPTOMS
VOMITING: 0
ABDOMINAL PAIN: 1
NAUSEA: 1
NAUSEA: 0
CHEST TIGHTNESS: 0
PHOTOPHOBIA: 0
RHINORRHEA: 0
ABDOMINAL DISTENTION: 1
BACK PAIN: 1
DIARRHEA: 0
COLOR CHANGE: 0
COUGH: 0
EYE REDNESS: 0
SHORTNESS OF BREATH: 0
SORE THROAT: 0
FACIAL SWELLING: 0
BACK PAIN: 0
BLOOD IN STOOL: 0
TROUBLE SWALLOWING: 0
DIARRHEA: 1
SINUS PRESSURE: 0
SINUS PAIN: 0
WHEEZING: 0
CONSTIPATION: 0

## 2022-07-26 ASSESSMENT — PAIN DESCRIPTION - FREQUENCY: FREQUENCY: CONTINUOUS

## 2022-07-26 ASSESSMENT — PAIN SCALES - GENERAL
PAINLEVEL_OUTOF10: 8
PAINLEVEL_OUTOF10: 9
PAINLEVEL_OUTOF10: 7
PAINLEVEL_OUTOF10: 8

## 2022-07-26 ASSESSMENT — PAIN DESCRIPTION - ORIENTATION
ORIENTATION: LEFT
ORIENTATION: LEFT;LOWER

## 2022-07-26 ASSESSMENT — PAIN DESCRIPTION - LOCATION
LOCATION: ABDOMEN
LOCATION: ABDOMEN

## 2022-07-26 ASSESSMENT — PAIN DESCRIPTION - PAIN TYPE: TYPE: ACUTE PAIN

## 2022-07-26 ASSESSMENT — PAIN DESCRIPTION - DESCRIPTORS
DESCRIPTORS: ACHING
DESCRIPTORS: ACHING

## 2022-07-26 ASSESSMENT — PAIN DESCRIPTION - ONSET: ONSET: ON-GOING

## 2022-07-26 NOTE — ED PROVIDER NOTES
[facility] [address] [facility]     Emergency Department     Faculty Attestation    I performed a history and physical examination of the patient and discussed management with the resident. I reviewed the residents note and agree with the documented findings and plan of care. Any areas of disagreement are noted on the chart. I was personally present for the key portions of any procedures. I have documented in the chart those procedures where I was not present during the key portions. I have reviewed the emergency nurses triage note. I agree with the chief complaint, past medical history, past surgical history, allergies, medications, social and family history as documented unless otherwise noted below. For Physician Assistant/ Nurse Practitioner cases/documentation I have personally evaluated this patient and have completed at least one if not all key elements of the E/M (history, physical exam, and MDM). Additional findings are as noted. Patient presents with lower abdominal pain that she says started last night. She says it is more on the left than the right. She denies fever, chills, chest pain, shortness of breath, nausea, vomiting, dysuria, hematuria. Patient says she does have chronically loose stools but denies any blood in the stool. Patient was seen here earlier this month and diagnosed with diverticulitis. Patient says she took all but 2 pills of the Augmentin prescription that she was discharged home on. On exam, patient is resting comfortably in the bed. Lungs clear to auscultation bilaterally heart sounds are normal.  Abdomen is soft with moderate left lower quadrant tenderness. No rebound or guarding is present. We will get labs and CT scan of the abdomen. We will treat patient's pain and nausea and reassess.       Reji Kelley MD  Attending Emergency  Physician            Jasvir Rollins MD  07/26/22 9478

## 2022-07-26 NOTE — ED PROVIDER NOTES
on file   Social Connections: Not on file   Intimate Partner Violence: Not on file   Housing Stability: Not on file       History reviewed. No pertinent family history. Allergies:  Trazodone and Trazodone and nefazodone    Home Medications:  Prior to Admission medications    Not on File       REVIEW OFSYSTEMS    (2-9 systems for level 4, 10 or more for level 5)      Review of Systems   Constitutional:  Negative for chills, diaphoresis, fatigue and fever. HENT:  Negative for congestion, sinus pressure, sinus pain, sore throat and trouble swallowing. Eyes:  Negative for photophobia, redness and visual disturbance. Respiratory:  Negative for cough, chest tightness and shortness of breath. Cardiovascular:  Negative for chest pain, palpitations and leg swelling. Gastrointestinal:  Positive for abdominal distention, abdominal pain and diarrhea. Negative for constipation, nausea and vomiting. Genitourinary:  Negative for difficulty urinating, dysuria, flank pain and urgency. Musculoskeletal:  Negative for back pain, neck pain and neck stiffness. Skin:  Negative for color change, pallor and rash. Neurological:  Negative for dizziness, tremors, speech difficulty, weakness, light-headedness and headaches. PHYSICAL EXAM   (up to 7 for level 4, 8 or more forlevel 5)      INITIAL VITALS:   ED Triage Vitals   BP Temp Temp src Pulse Resp SpO2 Height Weight   157/96 97.6 oral 89 15 99 -- --       Physical Exam  Constitutional:       General: She is not in acute distress. Appearance: She is obese. HENT:      Head: Normocephalic and atraumatic. Right Ear: External ear normal.      Left Ear: External ear normal.      Nose: Nose normal. No rhinorrhea. Eyes:      Extraocular Movements: Extraocular movements intact. Pupils: Pupils are equal, round, and reactive to light. Cardiovascular:      Rate and Rhythm: Normal rate and regular rhythm. Pulses: Normal pulses. Heart sounds:  No murmur heard. Pulmonary:      Effort: Pulmonary effort is normal.      Breath sounds: Normal breath sounds. Abdominal:      General: Abdomen is protuberant. Bowel sounds are normal.      Palpations: Abdomen is soft. There is no fluid wave. Tenderness: There is generalized abdominal tenderness and tenderness in the left lower quadrant. Hernia: No hernia is present. Musculoskeletal:         General: No swelling. Normal range of motion. Cervical back: Normal range of motion and neck supple. Skin:     General: Skin is warm and dry. Neurological:      General: No focal deficit present. Mental Status: She is alert and oriented to person, place, and time. DIFFERENTIAL  DIAGNOSIS     PLAN (LABS / IMAGING / EKG):  Orders Placed This Encounter   Procedures    CT ABDOMEN PELVIS W IV CONTRAST Additional Contrast? None    CBC with Diff    CMP    Lipase    Urinalysis with Reflex to Culture    HCG Qualitative, Serum    Microscopic Urinalysis    Telemetry monitoring - continuous duration    Inpatient consult to Internal Medicine    ADMIT TO INPATIENT       MEDICATIONS ORDERED:  Orders Placed This Encounter   Medications    0.9 % sodium chloride bolus    fentaNYL (SUBLIMAZE) injection 50 mcg    ondansetron (ZOFRAN) injection 4 mg    iopamidol (ISOVUE-370) 76 % injection 75 mL    fentaNYL (SUBLIMAZE) injection 50 mcg    ciprofloxacin (CIPRO) IVPB 400 mg     Order Specific Question:   Antimicrobial Indications     Answer:   Intra-Abdominal Infection    metronidazole (FLAGYL) 500 mg in 0.9% NaCl 100 mL IVPB premix     Order Specific Question:   Antimicrobial Indications     Answer:   Intra-Abdominal Infection       DDX: Diverticulitis, intra-abdominal abscess, ectopic pregnancy, pyelonephritis    Initial MDM/Plan: 40 y.o. female who presents with worsening abdominal pain.   Patient has history of diverticulitis was recently seen and evaluated on the fifth of this month secondary to diverticulitis flare and was discharged home on antibiotics. Patient is back stating pain is different and worse. She did complete her antibiotics aside from 2 oh doses. Patient also has distended abdomen today and appears very uncomfortable. Will obtain lab work and repeat CT scan. DIAGNOSTIC RESULTS / EMERGENCYDEPARTMENT COURSE / MDM     LABS:  Labs Reviewed   CBC WITH AUTO DIFFERENTIAL - Abnormal; Notable for the following components:       Result Value    WBC 13.6 (*)     RDW 14.6 (*)     Seg Neutrophils 75 (*)     Lymphocytes 17 (*)     Segs Absolute 10.09 (*)     All other components within normal limits   COMPREHENSIVE METABOLIC PANEL - Abnormal; Notable for the following components:    Glucose 113 (*)     BUN 5 (*)     Potassium 3.4 (*)     All other components within normal limits   URINALYSIS WITH REFLEX TO CULTURE - Abnormal; Notable for the following components:    Urine Hgb LARGE (*)     All other components within normal limits   LIPASE   HCG, SERUM, QUALITATIVE   MICROSCOPIC URINALYSIS         RADIOLOGY:  CT ABDOMEN PELVIS W IV CONTRAST Additional Contrast? None    Result Date: 7/26/2022  EXAMINATION: CT OF THE ABDOMEN AND PELVIS WITH CONTRAST 7/26/2022 9:27 am TECHNIQUE: CT of the abdomen and pelvis was performed with the administration of intravenous contrast. Multiplanar reformatted images are provided for review. Automated exposure control, iterative reconstruction, and/or weight based adjustment of the mA/kV was utilized to reduce the radiation dose to as low as reasonably achievable. COMPARISON: 07/05/2022 HISTORY: ORDERING SYSTEM PROVIDED HISTORY: abdominal distension, LLQ pain TECHNOLOGIST PROVIDED HISTORY: abdominal distension, LLQ pain Decision Support Exception - unselect if not a suspected or confirmed emergency medical condition->Emergency Medical Condition (MA) Reason for Exam: abdominal distentison llq pain FINDINGS: Lower Chest: The visualized heart and lungs show no acute abnormalities. Organs:  The liver, spleen, pancreas, kidneys and adrenal glands show no significant abnormality. Gallbladder shows no significant abnormality. GI/Bowel: There is limited evaluation due to absence of oral contrast. Stomach grossly normal.  Normal caliber small bowel loops showing no focal lesions. The appendix is normal. Scattered colonic diverticula. Previously noted mild acute diverticulitis at the splenic flexure has resolved. Interval development of distal descending colon thickening with marked surrounding inflammatory stranding and some trace of fluid on background of numerous diverticula consistent with acute diverticulitis. No abscess formation. No evidence for perforation. Pelvis: Pelvic organs unremarkable. Peritoneum/Retroperitoneum: Stable slightly prominent subcentimeter distal left para-aortic lymph nodes dating back to a study of November 6, 2019. [place] No ascites. Bones/Soft Tissues: No acute abnormality of the bones. The superficial soft tissues show no significant abnormalities. 1. Interval resolution of acute diverticulitis of splenic flexure. 2. Interval development of a more severe uncomplicated acute diverticulitis of the distal descending colon. EKG      All EKG's are interpreted by the Emergency Department Physicianwho either signs or Co-signs this chart in the absence of a cardiologist.    EMERGENCY DEPARTMENT COURSE:  ED Course as of 07/26/22 1047   Tue Jul 26, 2022   0901 WBC(!): 13.6 [CD]   0919 hCG Qual: NEGATIVE [CD]   200 New area of diverticulitis noted in the distal descending colon, uncomplicated. [CD]   1013 Given the fact patient was just on oral antibiotics and has new recurrent diverticulitis. Will admit for IV antibiotics. [CD]   1046 Discussed with internal medicine will admit patient for IV antibiotics.  [CD]      ED Course User Index  [CD] Danisha Lai DO          PROCEDURES:  None    CONSULTS:  IP CONSULT TO INTERNAL MEDICINE    CRITICAL CARE:      FINAL IMPRESSION 1. Diverticulitis of colon          DISPOSITION / PLAN     DISPOSITION Admitted 07/26/2022 10:47:26 AM      PATIENT REFERRED TO:  No follow-up provider specified.     DISCHARGE MEDICATIONS:  New Prescriptions    No medications on file       Oriana Pretty DO  Emergency Medicine Resident    (Please note that portions of this note were completed with a voice recognition program.Efforts were made to edit the dictations but occasionally words are mis-transcribed.)        Oriana Pretty DO  Resident  07/26/22 1042

## 2022-07-26 NOTE — CARE COORDINATION
07/26/22 1750   Service Assessment   Patient Orientation Alert and Oriented;Person;Place;Situation   Cognition Alert   History Provided By Patient   Primary Caregiver Self   Support Systems Spouse/Significant Other   PCP Verified by CM No  (patient does not have PCP. provided patient with merc PCP list)   Prior Functional Level Independent in ADLs/IADLs   Current Functional Level Independent in ADLs/IADLs   Can patient return to prior living arrangement Yes   Ability to make needs known: Good   Family able to assist with home care needs: Yes   Social/Functional History   Lives With Spouse   Type of [address] One level   Home Access Stairs to enter without rails   Entrance Stairs - Number of Steps 2   Receives Help From [address] Responsibilities Yes   Ambulation Assistance Independent   Transfer Assistance Independent   Active  Yes   Mode of Transportation Car   Discharge Planning   Type of [address] Prior To Admission None   Potential Assistance Needed N/A   DME Ordered? No   Potential Assistance Purchasing Medications No   Type of Home Care Services None   Patient expects to be discharged to: House   One/Two Story Residence One story   History of falls? 0   Services At/After Discharge   [employer] Information Provided? No   Mode of Transport at Discharge Other (see comment)  (spouse to provide transportation home)   Condition of Participation: Discharge Planning   The Plan for Transition of Care is related to the following treatment goals: comfort       Met with patient to discuss transitional planning. Plan is home with spouse. Patient has transportation home. Patient provided list of [facility] primary care providers as she does not have a primary care physician. Patient is agreeable to plan and denies further needs at this time.

## 2022-07-26 NOTE — ED NOTES
Pt to ER for c/o abdominal pain that started last night. Pt states that the pain radiates to the back but it does not feel like kidney stones to her. Pt has hx of diverticulitis and feels like this is a similar pain to that. Pt denies any dysuria or hematuria. Pt states that she does have nausea with the pain but denies any other complaints at this time. Upon assessment pt abdomen is tender to the touch. Pt placed on monitor, RR even and NL. No acute distress noted. Dr. Deluca Gaston at bedside to evaluate pt.       Robles Michael RN  07/26/22 8439

## 2022-07-26 NOTE — H&P
[address]     Department of Internal Medicine - Staff Internal Medicine Teaching Service          ADMISSION NOTE/HISTORY AND PHYSICAL EXAMINATION   Date: 7/26/2022  Patient Name: Alfred Person  Date of admission: 7/26/2022  8:11 AM  YOB: 1975  PCP: No primary care provider on file. History Obtained From:  patient, electronic medical record    CHIEF COMPLAINT     Chief complaint: Abdominal pain, nausea    HISTORY OF PRESENTING ILLNESS     The patient is a pleasant 40 y.o. female presents with a chief complaint of nausea, and left sided lower abdominal pain that started last night. Abdominal pain is located in left lower quadrant, constant, severe with 7/10 in intensity, sharp in nature, without radiation, associated with nausea without vomiting and abdominal distention. She denies any aggravating or relieving factors. Patient has a history of multiple episodes of acute diverticulitis requiring IV antibiotics since 2008, has a history of diverticulosis. Earlier this month, on 7/5/2022, patient came to [facility] ER for similar complaint of abdominal pain and was diagnosed with mild acute diverticulitis of the splenic flexure of the colon and was discharged on oral Augmentin. Patient reported that she completed 2 weeks course of oral Augmentin. She has chronic diarrhea and denies constipation, vomiting, fever, chills, chest pain, dysuria, urinary frequency or urgency. Patient has PMH significant of hypothyroidism (on Armor 60 mg daily) and Hx of diverticulosis with multiple diverticulitis in past.     In the ED, patient was afebrile, hypertensive /96, saturating well on room air. On exam, patient is in mild painful distress. Abdominal exam has BS+, distended, soft, tenderness to palpation on LLQ. Lung and heart exam within normal limits. CT Abdomen Pelvis W IV contrast shows interval resolution of acute diverticulitis of splenic flexure.  Interval development of a severe uncomplicated acute diverticulitis of the distal descending colon. Significant labs,  K 3.4, Cr 0.63  WBC 13.6, glucose 113  UA- large Hgb, negative leukocyte esterase    Patient is admitted for severe uncomplicated acute diverticulitis of distal descending colon. Review of Systems:  Review of Systems   Constitutional:  Positive for appetite change. Negative for chills and fever. HENT:  Negative for facial swelling, postnasal drip, rhinorrhea and sneezing. Respiratory:  Negative for cough, shortness of breath and wheezing. Cardiovascular:  Negative for chest pain, palpitations and leg swelling. Gastrointestinal:  Positive for abdominal pain and nausea. Negative for blood in stool and diarrhea. Genitourinary:  Negative for dysuria, flank pain and urgency. Musculoskeletal:  Positive for back pain. Negative for myalgias and neck pain. Neurological:  Negative for tremors, seizures, weakness and headaches. Psychiatric/Behavioral:  Negative for agitation, behavioral problems and confusion. PAST MEDICAL HISTORY     Past Medical History:   Diagnosis Date    Hypothyroidism        PAST SURGICAL HISTORY     Past Surgical History:   Procedure Laterality Date    BREAST REDUCTION SURGERY         ALLERGIES     Trazodone and Trazodone and nefazodone    MEDICATIONS PRIOR TO ADMISSION     Prior to Admission medications    Not on File       SOCIAL HISTORY     Tobacco: Smokes 1/3 pack/day. Alcohol: Denies use  Illicits: Marijuana use-socially   Occupation: N/A    FAMILY HISTORY     History reviewed. No pertinent family history.     PHYSICAL EXAM     Vitals: BP (!) 157/96   Pulse 89   Temp 97.6 °F (36.4 °C) (Oral)   Resp 15   Wt 198 lb (89.8 kg)   LMP  (LMP Unknown)   SpO2 99%   BMI 35.07 kg/m²   Tmax: Temp (24hrs), Av.6 °F (36.4 °C), Min:97.6 °F (36.4 °C), Max:97.6 °F (36.4 °C)    Last Body weight:   Wt Readings from Last 3 Encounters:   22 198 lb (89.8 kg)   19 198 lb 13.7 oz (90.2 kg)     Body Mass Index : Body mass index is 35.07 kg/m². PHYSICAL EXAMINATION:  Constitutional: This is a well developed, well nourished, 35-39.9 - Obesity Grade II 40y.o. year old female who is alert, oriented, cooperative and in no apparent distress. Head:normocephalic and atraumatic. EENT:  PERRLA. No conjunctival injections. Septum was midline, mucosa was without erythema, exudates or cobblestoning. No thrush was noted. Neck: Supple without thyromegaly. No elevated JVP. Trachea was midline. Respiratory: Chest was symmetrical without dullness to percussion. Breath sounds bilaterally were clear to auscultation. There were no wheezes, rhonchi or rales. There is no intercostal retraction or use of accessory muscles. No egophony noted. Cardiovascular: Regular without murmur, clicks, gallops or rubs. Abdomen: Slightly distended abdomen, soft, BS+. LLQ tenderness on palpation. RUQ- mild tenderness on palpation. Lymphatic: No lymphadenopathy. Musculoskeletal: Normal curvature of the spine. No gross muscle weakness. Extremities:  No lower extremity edema, ulcerations, tenderness, varicosities or erythema. Muscle size, tone and strength are normal.  No involuntary movements are noted. Skin:  Warm and dry. Good color, turgor and pigmentation. No lesions or scars.   No cyanosis or clubbing  Neurological/Psychiatric: The patient's general behavior, level of consciousness, thought content and emotional status is normal.          INVESTIGATIONS     Laboratory Testing:     Recent Results (from the past 24 hour(s))   CBC with Diff    Collection Time: 07/26/22  8:50 AM   Result Value Ref Range    WBC 13.6 (H) 3.5 - 11.3 k/uL    RBC 4.49 3.95 - 5.11 m/uL    Hemoglobin 12.5 11.9 - 15.1 g/dL    Hematocrit 38.4 36.3 - 47.1 %    MCV 85.5 82.6 - 102.9 fL    MCH 27.8 25.2 - 33.5 pg    MCHC 32.6 28.4 - 34.8 g/dL    RDW 14.6 (H) 11.8 - 14.4 %    Platelets 567 390 - 129 k/uL    MPV 9.4 8.1 - 13.5 fL    NRBC Automated 0.0 0.0 per 100 WBC    Seg Neutrophils 75 (H) 36 - 65 %    Lymphocytes 17 (L) 24 - 43 %    Monocytes 7 3 - 12 %    Eosinophils % 1 1 - 4 %    Basophils 0 0 - 2 %    Immature Granulocytes 0 0 %    Segs Absolute 10.09 (H) 1.50 - 8.10 k/uL    Absolute Lymph # 2.36 1.10 - 3.70 k/uL    Absolute Mono # 0.94 0.10 - 1.20 k/uL    Absolute Eos # 0.07 0.00 - 0.44 k/uL    Basophils Absolute 0.06 0.00 - 0.20 k/uL    Absolute Immature Granulocyte 0.05 0.00 - 0.30 k/uL    RBC Morphology ANISOCYTOSIS PRESENT    CMP    Collection Time: 07/26/22  8:50 AM   Result Value Ref Range    Glucose 113 (H) 70 - 99 mg/dL    BUN 5 (L) 6 - 20 mg/dL    Creatinine 0.63 0.50 - 0.90 mg/dL    Calcium 9.1 8.6 - 10.4 mg/dL    Sodium 137 135 - 144 mmol/L    Potassium 3.4 (L) 3.7 - 5.3 mmol/L    Chloride 103 98 - 107 mmol/L    CO2 21 20 - 31 mmol/L    Anion Gap 13 9 - 17 mmol/L    Alkaline Phosphatase 70 35 - 104 U/L    ALT 13 5 - 33 U/L    AST 19 <32 U/L    Total Bilirubin 0.80 0.3 - 1.2 mg/dL    Total Protein 6.9 6.4 - 8.3 g/dL    Albumin 4.1 3.5 - 5.2 g/dL    Albumin/Globulin Ratio 1.5 1.0 - 2.5    GFR Non-African American >60 >60 mL/min    GFR African American >60 >60 mL/min    GFR Comment         Lipase    Collection Time: 07/26/22  8:50 AM   Result Value Ref Range    Lipase 21 13 - 60 U/L   HCG Qualitative, Serum    Collection Time: 07/26/22  8:50 AM   Result Value Ref Range    hCG Qual NEGATIVE NEGATIVE   Urinalysis with Reflex to Culture    Collection Time: 07/26/22  9:13 AM    Specimen: Urine   Result Value Ref Range    Color, UA Yellow Yellow    Turbidity UA Clear Clear    Glucose, Ur NEGATIVE NEGATIVE    Bilirubin Urine NEGATIVE NEGATIVE    Ketones, Urine NEGATIVE NEGATIVE    Specific Gravity, UA 1.010 1.005 - 1.030    Urine Hgb LARGE (A) NEGATIVE    pH, UA 6.5 5.0 - 8.0    Protein, UA NEGATIVE NEGATIVE    Urobilinogen, Urine Normal Normal    Nitrite, Urine NEGATIVE NEGATIVE    Leukocyte Esterase, Urine NEGATIVE NEGATIVE   Microscopic Urinalysis    Collection Time: 07/26/22  9:13 AM   Result Value Ref Range    -          WBC, UA 0 TO 2 0 - 5 /HPF    RBC, UA TOO NUMEROUS TO COUNT 0 - 4 /HPF    Epithelial Cells UA 0 TO 2 0 - 5 /HPF       Imaging:   CT ABDOMEN PELVIS W IV CONTRAST Additional Contrast? None    Result Date: 7/26/2022  1. Interval resolution of acute diverticulitis of splenic flexure. 2. Interval development of a more severe uncomplicated acute diverticulitis of the distal descending colon. ASSESSMENT & PLAN     ASSESSMENT / PLAN:     IMPRESSION  This is a 40 y.o. female who presented with lower abdominal pain and nausea and found to have acute diverticulitis. Principal Problem:    Acute diverticulitis  Active Problems:    Diverticulosis    Hypothyroidism  Resolved Problems:    Diverticulitis    Acute Diverticulitis: Leukocytosis, CT Abdomen shows interval resolution of acute diverticulitis of splenic flexure. Started Ciprofloxacin 400 mg IV BID and Flagyl 600 mg IV TID. Will continue Abx for 10 days. Start on IVF maintenance NS @ 100 mL/hr. Will start on clear liquid diet as tolerated. Zofran for nausea. Morphine 2 mg IV q4 hours for pain. Serial abdominal exam. Bowel regimen as needed for constipation. Hypothyroidism: TSH pending, Last TSH 5/22: 1.91. Resume Armor 60 mg daily. Doesn't tolerate Synthroid. Hypokalemia: K 3.3, replaced K with 40 mEq. Monitor BMP daily, replace as needed. Hx of diverticulosis with multiple diverticulitis: patient has chronic diarrhea, was constipated in past when diverticulitis flare up. Follow up with GI as outpatient for colonoscopy. No prior colonoscopy in past.   Chronic diarrhea: GI molecular stool studies. Follow up with GI as outpatient. DVT ppx: Lovenox  GI ppx: Protonix    PT/OT: Following   Discharge Planning:  consulted, will follow up. Shyam Solomon MD  Internal Medicine Resident, PGY-2  [address] [facility], [place]  7/26/2022, 10:28 AM  Attending Physician Statement  I have discussed the care of [facility], including pertinent history and exam findings,  with the resident. I have seen and examined the patient and the key elements of all parts of the encounter have been performed by me. I agree with the assessment, plan and orders as documented by the resident with additions . Agree with management plans    Treatment plan Discussed with nursing staff in detail , all questions answered . Electronically signed by Marcia Doshi MD on   7/26/22 at 10:27 PM EDT    Please note that this chart was generated using voice recognition Dragon dictation software. Although every effort was made to ensure the accuracy of this automated transcription, some errors in transcription may have occurred.

## 2022-07-27 PROBLEM — R10.32 LLQ ABDOMINAL PAIN: Status: ACTIVE | Noted: 2022-07-27

## 2022-07-27 PROBLEM — R19.5 LOOSE STOOLS: Status: ACTIVE | Noted: 2022-07-27

## 2022-07-27 LAB
ABSOLUTE EOS #: 0.1 K/UL (ref 0–0.44)
ABSOLUTE IMMATURE GRANULOCYTE: 0.03 K/UL (ref 0–0.3)
ABSOLUTE LYMPH #: 3.27 K/UL (ref 1.1–3.7)
ABSOLUTE MONO #: 0.77 K/UL (ref 0.1–1.2)
ANION GAP SERPL CALCULATED.3IONS-SCNC: 12 MMOL/L (ref 9–17)
BASOPHILS # BLD: 0 % (ref 0–2)
BASOPHILS ABSOLUTE: 0.04 K/UL (ref 0–0.2)
BUN BLDV-MCNC: 4 MG/DL (ref 6–20)
CALCIUM SERPL-MCNC: 8.6 MG/DL (ref 8.6–10.4)
CHLORIDE BLD-SCNC: 107 MMOL/L (ref 98–107)
CO2: 21 MMOL/L (ref 20–31)
CREAT SERPL-MCNC: 0.64 MG/DL (ref 0.5–0.9)
EOSINOPHILS RELATIVE PERCENT: 1 % (ref 1–4)
GFR AFRICAN AMERICAN: >60 ML/MIN
GFR NON-AFRICAN AMERICAN: >60 ML/MIN
GFR SERPL CREATININE-BSD FRML MDRD: ABNORMAL ML/MIN/{1.73_M2}
GLUCOSE BLD-MCNC: 86 MG/DL (ref 70–99)
HCT VFR BLD CALC: 38.4 % (ref 36.3–47.1)
HEMOGLOBIN: 11.7 G/DL (ref 11.9–15.1)
IMMATURE GRANULOCYTES: 0 %
LYMPHOCYTES # BLD: 30 % (ref 24–43)
MCH RBC QN AUTO: 29.1 PG (ref 25.2–33.5)
MCHC RBC AUTO-ENTMCNC: 30.5 G/DL (ref 28.4–34.8)
MCV RBC AUTO: 95.5 FL (ref 82.6–102.9)
MONOCYTES # BLD: 7 % (ref 3–12)
NRBC AUTOMATED: 0 PER 100 WBC
PDW BLD-RTO: 14.9 % (ref 11.8–14.4)
PLATELET # BLD: 245 K/UL (ref 138–453)
PMV BLD AUTO: 10.1 FL (ref 8.1–13.5)
POTASSIUM SERPL-SCNC: 4 MMOL/L (ref 3.7–5.3)
RBC # BLD: 4.02 M/UL (ref 3.95–5.11)
RBC # BLD: ABNORMAL 10*6/UL
SEG NEUTROPHILS: 61 % (ref 36–65)
SEGMENTED NEUTROPHILS ABSOLUTE COUNT: 6.6 K/UL (ref 1.5–8.1)
SODIUM BLD-SCNC: 140 MMOL/L (ref 135–144)
WBC # BLD: 10.8 K/UL (ref 3.5–11.3)

## 2022-07-27 PROCEDURE — 6360000002 HC RX W HCPCS

## 2022-07-27 PROCEDURE — 1200000000 HC SEMI PRIVATE

## 2022-07-27 PROCEDURE — 85025 COMPLETE CBC W/AUTO DIFF WBC: CPT

## 2022-07-27 PROCEDURE — 2580000003 HC RX 258

## 2022-07-27 PROCEDURE — 6370000000 HC RX 637 (ALT 250 FOR IP)

## 2022-07-27 PROCEDURE — 2500000003 HC RX 250 WO HCPCS

## 2022-07-27 PROCEDURE — 99222 1ST HOSP IP/OBS MODERATE 55: CPT | Performed by: INTERNAL MEDICINE

## 2022-07-27 PROCEDURE — 80048 BASIC METABOLIC PNL TOTAL CA: CPT

## 2022-07-27 PROCEDURE — 36415 COLL VENOUS BLD VENIPUNCTURE: CPT

## 2022-07-27 PROCEDURE — 99232 SBSQ HOSP IP/OBS MODERATE 35: CPT | Performed by: INTERNAL MEDICINE

## 2022-07-27 PROCEDURE — 76937 US GUIDE VASCULAR ACCESS: CPT

## 2022-07-27 RX ORDER — MORPHINE SULFATE 2 MG/ML
2 INJECTION, SOLUTION INTRAMUSCULAR; INTRAVENOUS ONCE
Status: COMPLETED | OUTPATIENT
Start: 2022-07-27 | End: 2022-07-28

## 2022-07-27 RX ORDER — OXYCODONE HYDROCHLORIDE AND ACETAMINOPHEN 5; 325 MG/1; MG/1
1 TABLET ORAL ONCE
Status: COMPLETED | OUTPATIENT
Start: 2022-07-27 | End: 2022-07-27

## 2022-07-27 RX ADMIN — CIPROFLOXACIN 400 MG: 2 INJECTION, SOLUTION INTRAVENOUS at 10:20

## 2022-07-27 RX ADMIN — MORPHINE SULFATE 2 MG: 4 INJECTION, SOLUTION INTRAMUSCULAR; INTRAVENOUS at 11:37

## 2022-07-27 RX ADMIN — MORPHINE SULFATE 2 MG: 4 INJECTION, SOLUTION INTRAMUSCULAR; INTRAVENOUS at 01:59

## 2022-07-27 RX ADMIN — OXYCODONE HYDROCHLORIDE AND ACETAMINOPHEN 1 TABLET: 5; 325 TABLET ORAL at 21:40

## 2022-07-27 RX ADMIN — METRONIDAZOLE 500 MG: 500 INJECTION, SOLUTION INTRAVENOUS at 19:50

## 2022-07-27 RX ADMIN — METRONIDAZOLE 500 MG: 500 INJECTION, SOLUTION INTRAVENOUS at 11:44

## 2022-07-27 RX ADMIN — ONDANSETRON 4 MG: 2 INJECTION INTRAMUSCULAR; INTRAVENOUS at 11:40

## 2022-07-27 RX ADMIN — CIPROFLOXACIN 400 MG: 2 INJECTION, SOLUTION INTRAVENOUS at 22:49

## 2022-07-27 RX ADMIN — METRONIDAZOLE 500 MG: 500 INJECTION, SOLUTION INTRAVENOUS at 05:07

## 2022-07-27 RX ADMIN — PANTOPRAZOLE SODIUM 40 MG: 40 TABLET, DELAYED RELEASE ORAL at 05:15

## 2022-07-27 RX ADMIN — THYROID, PORCINE 60 MG: 60 TABLET ORAL at 05:12

## 2022-07-27 RX ADMIN — SODIUM CHLORIDE: 9 INJECTION, SOLUTION INTRAVENOUS at 07:01

## 2022-07-27 RX ADMIN — MORPHINE SULFATE 2 MG: 4 INJECTION, SOLUTION INTRAMUSCULAR; INTRAVENOUS at 18:45

## 2022-07-27 RX ADMIN — MORPHINE SULFATE 2 MG: 4 INJECTION, SOLUTION INTRAMUSCULAR; INTRAVENOUS at 07:24

## 2022-07-27 RX ADMIN — ONDANSETRON 4 MG: 4 TABLET, ORALLY DISINTEGRATING ORAL at 21:40

## 2022-07-27 ASSESSMENT — PAIN DESCRIPTION - LOCATION
LOCATION: ABDOMEN

## 2022-07-27 ASSESSMENT — PAIN SCALES - GENERAL
PAINLEVEL_OUTOF10: 8
PAINLEVEL_OUTOF10: 7
PAINLEVEL_OUTOF10: 8
PAINLEVEL_OUTOF10: 10
PAINLEVEL_OUTOF10: 8
PAINLEVEL_OUTOF10: 6
PAINLEVEL_OUTOF10: 8

## 2022-07-27 ASSESSMENT — PAIN DESCRIPTION - DESCRIPTORS
DESCRIPTORS: STABBING
DESCRIPTORS: DISCOMFORT
DESCRIPTORS: ACHING
DESCRIPTORS: DISCOMFORT
DESCRIPTORS: ACHING

## 2022-07-27 ASSESSMENT — PAIN DESCRIPTION - ORIENTATION
ORIENTATION: LEFT;LOWER
ORIENTATION: LEFT;LOWER
ORIENTATION: LEFT
ORIENTATION: LEFT
ORIENTATION: LEFT;LOWER

## 2022-07-27 NOTE — PROGRESS NOTES
[facility]  Internal Medicine Teaching Residency Program  Inpatient Daily Progress Note  ______________________________________________________________________________    Patient: Femi Suazo  YOB: 1975   UMESH:[id number]    Acct: [de-identified]     Room: 12/0-36  Admit date: 7/26/2022  Today's date: 07/27/22  Number of days in the hospital: 1    SUBJECTIVE   Admitting Diagnosis: Acute diverticulitis  Chief complaint: Abdominal pain, nausea. Pt examined at bedside. Chart & results reviewed. No acute events reported overnight. Vital stable. Labs reviewed. Patient still complains of pain in the left lower abdomen, also feels pain in her hip bone, feels like an unexplainable funny sensation in her hip bone. The patient denies any abdominal pain, shortness of breath, nausea, vomiting. The patient is on liquid diet for now, able to take liquids without any difficulty, no difficulty in swallowing, advance as tolerated. The patient feels like she is passing gas, had a bowel movement yesterday. ROS:  Constitutional:  negative for chills, fevers, sweats  Respiratory:  negative for cough, dyspnea on exertion, hemoptysis, shortness of breath, wheezing  Cardiovascular:  negative for chest pain, chest pressure/discomfort, lower extremity edema, palpitations  Gastrointestinal:  negative for abdominal pain, constipation, diarrhea, nausea, vomiting  Neurological:  negative for dizziness, headache  BRIEF HISTORY     The patient is a pleasant 40 y.o. female presents with a chief complaint of nausea, and left sided lower abdominal pain that started last night. Abdominal pain is located in left lower quadrant, constant, severe with 7/10 in intensity, sharp in nature, without radiation, associated with nausea without vomiting and abdominal distention. She denies any aggravating or relieving factors.   Patient has a history of multiple episodes of acute diverticulitis requiring IV antibiotics since , has a history of diverticulosis. Earlier this month, on 2022, patient came to [place] [address] ER for similar complaint of abdominal pain and was diagnosed with mild acute diverticulitis of the splenic flexure of the colon and was discharged on oral Augmentin. Patient reported that she completed 2 weeks course of oral Augmentin. She has chronic diarrhea and denies constipation, vomiting, fever, chills, chest pain, dysuria, urinary frequency or urgency. Patient has PMH significant of hypothyroidism (on Armor 60 mg daily) and Hx of diverticulosis with multiple diverticulitis in past.     In the ED, patient was afebrile, hypertensive /96, saturating well on room air. On exam, patient is in mild painful distress. Abdominal exam has BS+, distended, soft, tenderness to palpation on LLQ. Lung and heart exam within normal limits. CT Abdomen Pelvis W IV contrast shows interval resolution of acute diverticulitis of splenic flexure. Interval development of a severe uncomplicated acute diverticulitis of the distal descending colon. Significant labs,  K 3.4, Cr 0.63  WBC 13.6, glucose 113  UA- large Hgb, negative leukocyte esterase     Patient is admitted for severe uncomplicated acute diverticulitis of distal descending colon. OBJECTIVE     Vital Signs:  /66   Pulse 73   Temp 98.8 °F (37.1 °C) (Oral)   Resp 17   Ht 5' 3\" (1.6 m)   Wt 192 lb 8 oz (87.3 kg)   LMP  (LMP Unknown)   SpO2 96%   BMI 34.10 kg/m²     Temp (24hrs), Av.4 °F (36.9 °C), Min:97.6 °F (36.4 °C), Max:98.8 °F (37.1 °C)    In: 1640.4   Out: -     Physical Exam:  Constitutional: This is a well developed, well nourished, 35-39.9 - Obesity Grade II 40y.o. year old female who is alert, oriented, cooperative and in no apparent distress. Head:normocephalic and atraumatic. EENT:  PERRLA. No conjunctival injections.    Septum was midline, mucosa was without erythema, exudates or cobblestoning. No thrush was noted. Neck: Supple without thyromegaly. No elevated JVP. Trachea was midline. Respiratory: Chest was symmetrical without dullness to percussion. Breath sounds bilaterally were clear to auscultation. There were no wheezes, rhonchi or rales. There is no intercostal retraction or use of accessory muscles. No egophony noted. Cardiovascular: Regular without murmur, clicks, gallops or rubs. Abdomen: Slightly rounded and soft without organomegaly. No rebound, rigidity or guarding was appreciated. Lymphatic: No lymphadenopathy. Musculoskeletal: Normal curvature of the spine. No gross muscle weakness. Extremities:  No lower extremity edema, ulcerations, tenderness, varicosities or erythema. Muscle size, tone and strength are normal.  No involuntary movements are noted. Skin:  Warm and dry. Good color, turgor and pigmentation. No lesions or scars.   No cyanosis or clubbing  Neurological/Psychiatric: The patient's general behavior, level of consciousness, thought content and emotional status is normal.        Medications:  Scheduled Medications:    sodium chloride flush  5-40 mL IntraVENous 2 times per day    enoxaparin  40 mg SubCUTAneous Daily    pantoprazole  40 mg Oral QAM AC    ciprofloxacin  400 mg IntraVENous Q12H    metroNIDAZOLE  500 mg IntraVENous Q8H    thyroid  60 mg Oral QAM AC     Continuous Infusions:    sodium chloride      sodium chloride 100 mL/hr at 07/27/22 0701     PRN Medicationssodium chloride flush, 5-40 mL, PRN  sodium chloride, , PRN  ondansetron, 4 mg, Q8H PRN   Or  ondansetron, 4 mg, Q6H PRN  polyethylene glycol, 17 g, Daily PRN  acetaminophen, 650 mg, Q6H PRN   Or  acetaminophen, 650 mg, Q6H PRN  potassium chloride, 10 mEq, PRN  magnesium sulfate, 2,000 mg, PRN  morphine, 2 mg, Q4H PRN      Diagnostic Labs:  CBC:   Recent Labs     07/26/22  0850 07/27/22  0555   WBC 13.6* 10.8   RBC 4.49 4.02   HGB 12.5 11.7*   HCT 38.4 38.4   MCV 85.5 95.5   RDW 14.6* 14.9*    245     BMP:   Recent Labs     07/26/22  0850 07/27/22  0555    140   K 3.4* 4.0    107   CO2 21 21   BUN 5* 4*   CREATININE 0.63 0.64     BNP: No results for input(s): BNP in the last 72 hours. PT/INR: No results for input(s): PROTIME, INR in the last 72 hours. APTT: No results for input(s): APTT in the last 72 hours. CARDIAC ENZYMES: No results for input(s): CKMB, CKMBINDEX, TROPONINI in the last 72 hours. Invalid input(s): CKTOTAL;3  FASTING LIPID PANEL:No results found for: CHOL, HDL, TRIG  LIVER PROFILE:   Recent Labs     07/26/22  0850   AST 19   ALT 13   BILITOT 0.80   ALKPHOS 70      MICROBIOLOGY: No results found for: CULTURE    Imaging:    CT ABDOMEN PELVIS W IV CONTRAST Additional Contrast? None    Result Date: 7/26/2022  1. Interval resolution of acute diverticulitis of splenic flexure. 2. Interval development of a more severe uncomplicated acute diverticulitis of the distal descending colon. ASSESSMENT & PLAN     ASSESSMENT / PLAN:     Principal Problem:    Acute diverticulitis  Active Problems:    Diverticulosis    Hypokalemia    Diverticulitis of colon    Hypothyroidism  Resolved Problems:    Diverticulitis       Acute Diverticulitis: Leukocytosis, CT Abdomen shows interval resolution of acute diverticulitis of splenic flexure. Started Ciprofloxacin 400 mg IV BID and Flagyl 600 mg IV TID. Will continue Abx for 10 days. Start on IVF maintenance NS @ 100 mL/hr. Will start on clear liquid diet as tolerated. Zofran for nausea. Morphine 2 mg IV q4 hours for pain. Serial abdominal exam. Bowel regimen as needed for constipation. Hypothyroidism: TSH pending, Last TSH 5/22: 1.91. Resume Armor 60 mg daily. Doesn't tolerate Synthroid. Hypokalemia: K 3.3, replaced K with 40 mEq. Monitor BMP daily, replace as needed. Hx of diverticulosis with multiple diverticulitis: patient has chronic diarrhea, was constipated in past when diverticulitis flare up. Follow up with GI as outpatient for colonoscopy. No prior colonoscopy in past.    Chronic diarrhea: GI molecular stool studies. Follow up with GI as outpatient. DVT ppx: Lovenox  GI ppx: Protonix     PT/OT: Following   Discharge Planning:  consulted, will follow up. Jessica Hunter MD  Internal Medicine Resident, PGY-1  [facility]; [facility], [address] [facility]  7/27/2022, 8:05 AM  Attending Physician Statement  I have discussed the care of [facility], including pertinent history and exam findings,  with the resident. I have seen and examined the patient and the key elements of all parts of the encounter have been performed by me. I agree with the assessment, plan and orders as documented by the resident with additions . Treatment plan Discussed with nursing staff in detail , all questions answered . Electronically signed by Anton Tarango MD on   7/27/22 at 3:34 PM EDT    Please note that this chart was generated using voice recognition Dragon dictation software. Although every effort was made to ensure the accuracy of this automated transcription, some errors in transcription may have occurred.

## 2022-07-27 NOTE — PROGRESS NOTES
Physical Therapy        Physical Therapy Cancel Note      DATE: 2022    NAME: John Jones  MRN: [id number]   : 1975      Patient not seen this date for Physical Therapy due to:    Patient independent with functional mobility. Will defer PT evaluation at this time. Please reorder PT if future needs arise.        Electronically signed by Glendon Cockayne, PT on 2022 at 3:34 PM

## 2022-07-27 NOTE — PROGRESS NOTES
[facility]up[facility] [address]  Occupational Therapy Not Seen Note    DATE: 2022    NAME: Castillo Donato  MRN: [id number]   : 1975      Patient not seen this date for Occupational Therapy due to:    Patient independent with ADLs and functional tasks with no acute OT needs. Will defer OT evaluation at this time. Please reorder OT if future needs arise. Upon arrival pt supine in bed crying, pt upset about encounter with a \"doctor\", writer provided some emotional support, pt states currently (I) with functional tasks at this time.     Next Scheduled Treatment: N/A    Electronically signed by Sonny Frankel, OT on 2022 at 10:43 AM

## 2022-07-27 NOTE — CONSULTS
[facility]    GASTROENTEROLOGY CONSULT    Patient:   Ellie De La Fuente   :    1975   Facility:   [facility]   Date:    2022  Admission Dx:  Diverticulitis [K57.92]  Diverticulitis of colon [K57.32]  Acute diverticulitis [K57.92]  Requesting physician: Beau Engle MD  Reason for consult:  Acute diverticulitis, chronic diarrhea, no prior colonoscopy   CC : LLQ pain    SUBJECTIVE     HISTORY OF PRESENT ILLNESS  This is a 40 y.o.  female who was admitted 2022 with Diverticulitis [K57.92]  Diverticulitis of colon [K57.32]  Acute diverticulitis [K57.92]. We have been asked to see the patient in consultation by Beau Engle MD for acute diverticulitis, chronic diarrhea, no prior colonoscopy. 21-year-old female with a history of diverticulosis with recurrent episodes of acute diverticulitis presented to the hospital  with reports of nausea and left lower abdominal pain that started. Pain is located in the left lower quadrant described as constant, severe, sharp radiating  to left hip and back associated with nausea and abdominal distention, no vomiting. No aggravating or alleviating factors. CT abdomen and pelvis showed interval resolution of acute diverticulitis in the splenic flexure though interval development of more severe uncomplicated acute diverticulitis in the distal descending colon. Records show patient was here 2022 with CT imaging showing mild acute diverticulitis of the splenic flexure. Patient was discharged on Augmentin 875/125 twice daily x10 days and Roxicodone. Patient reports this is approximately her 10th episode of diverticulitis. Patient reports typical bowel habits consist of loose stool since the age of 13. No hematochezia. Denies any family history of IBD or colon cancer.           Location/Symptom: Lower quadrant pain  Timing/Onset: Onset   Provocation: None  Quality: sharp  Radiation: to left hip and back   Severity: Severe  Timing/Duration: Constant  Modifying Factors: Prior history of recurrent diverticulitis      Summary of imaging completed at this time:    CT A/P 07/26/2022    Impression   1. Interval resolution of acute diverticulitis of splenic flexure. 2. Interval development of a more severe uncomplicated acute diverticulitis   of the distal descending colon. Previous GI history:   No prior colonoscopy      OBJECTIVE:     PAST MEDICAL/SURGICAL HISTORY  Past Medical History:   Diagnosis Date    Hypothyroidism      Past Surgical History:   Procedure Laterality Date    BREAST REDUCTION SURGERY         ALLERGIES:  Allergies   Allergen Reactions    Latex     Trazodone Anaphylaxis    Trazodone And Nefazodone        HOME MEDICATIONS:  Prior to Admission medications    Medication Sig Start Date End Date Taking? Authorizing Provider   thyroid (SALIMA) 60 MG tablet Take 60 mg by mouth in the morning. Yes Historical Provider, MD       CURRENT MEDICATIONS:  Scheduled Meds:   sodium chloride flush  5-40 mL IntraVENous 2 times per day    enoxaparin  40 mg SubCUTAneous Daily    pantoprazole  40 mg Oral QAM AC    ciprofloxacin  400 mg IntraVENous Q12H    metroNIDAZOLE  500 mg IntraVENous Q8H    thyroid  60 mg Oral QAM AC     Continuous Infusions:   sodium chloride      sodium chloride 100 mL/hr at 07/27/22 0701     PRN Meds:sodium chloride flush, sodium chloride, ondansetron **OR** ondansetron, polyethylene glycol, acetaminophen **OR** acetaminophen, potassium chloride, magnesium sulfate, morphine    SOCIAL HISTORY:     Tobacco:   reports that she has been smoking. She has never used smokeless tobacco.  Alcohol:   has no history on file for alcohol use. Illicit drugs:  reports no history of drug use. FAMILY HISTORY:     History reviewed. No pertinent family history. REVIEW OF SYSTEMS:    Constitutional: No fever, no chills, no lethargy, no weakness.   HEENT:  No headache, otalgia, itchy eyes, nasal discharge or sore throat. Cardiac:  No chest pain, dyspnea, orthopnea or PND. Chest:   No cough, phlegm or wheezing, no vomiting. Neuro:   No focal weakness, abnormal movements or seizure like activity. Skin:   No rashes, no itching. :   No hematuria, no pyuria, no dysuria, no flank pain. Extremities:  No swelling or joint pains. ROS was otherwise negative except as mentioned in the [address] PHYSICAL EXAM:    /73   Pulse 70   Temp 98.2 °F (36.8 °C) (Oral)   Resp 16   Ht 5' 3\" (1.6 m)   Wt 192 lb 8 oz (87.3 kg)   LMP  (LMP Unknown)   SpO2 96%   BMI 34.10 kg/m²     GENERAL:   Well developed, Well nourished, No apparent distress  HEAD:   Normocephalic, Atraumatic  EENT:   EOMI, Sclera not icteric, Oropharynx moist   NECK:   Supple, Trachea midline  LUNGS:  CTA Bilaterally  HEART:  RRR, No murmur  ABDOMEN:   Soft, LLQ tenderness, Nondistended, BS WNL  EXT:   No clubbing. No cyanosis. No edema. SKIN:   No rashes. No jaundice. No stigmata of liver disease.     MUSC/SKEL:   Adequate muscle bulk for patient's age, No significant synovitis, No deformities  NEURO:  A&O x Three, CN II- XII grossly intact      LABS AND IMAGING:     CBC  Recent Labs     07/26/22  0850 07/27/22  0555   WBC 13.6* 10.8   HGB 12.5 11.7*   HCT 38.4 38.4   MCV 85.5 95.5   MCH 27.8 29.1   MCHC 32.6 30.5    245       IMMATURE PLTs  No results found for: PLTFLUORE    BMP  Recent Labs     07/26/22  0850 07/27/22  0555    140   K 3.4* 4.0    107   CO2 21 21   BUN 5* 4*   CREATININE 0.63 0.64   GLUCOSE 113* 86   CALCIUM 9.1 8.6       LFTS  Recent Labs     07/26/22  0850   ALKPHOS 70   ALT 13   AST 19   PROT 6.9   BILITOT 0.80   LABALBU 4.1       AMYLASE/LIPASE/AMMONIA  Recent Labs     07/26/22  0850   LIPASE 21         IMAGING  CT ABDOMEN PELVIS W IV CONTRAST Additional Contrast? None    Result Date: 7/26/2022  EXAMINATION: CT OF THE ABDOMEN AND PELVIS WITH CONTRAST 7/26/2022 descending colon. CT ABDOMEN PELVIS W IV CONTRAST Additional Contrast? None    Result Date: 7/5/2022  EXAMINATION: CT OF THE ABDOMEN AND PELVIS WITH CONTRAST 7/5/2022 1:09 am TECHNIQUE: CT of the abdomen and pelvis was performed with the administration of intravenous contrast. Multiplanar reformatted images are provided for review. Automated exposure control, iterative reconstruction, and/or weight based adjustment of the mA/kV was utilized to reduce the radiation dose to as low as reasonably achievable. COMPARISON: None. HISTORY: ORDERING SYSTEM PROVIDED HISTORY: LLQ pain TECHNOLOGIST PROVIDED HISTORY: LLQ pain Decision Support Exception - unselect if not a suspected or confirmed emergency medical condition->Emergency Medical Condition (MA) Reason for Exam: llq pain FINDINGS: Lower Chest: No focal consolidation at the lung bases. The heart is not enlarged. No pericardial effusion. Organs: Liver: Suspect underlying hepatic steatosis. No focal abnormality. Gallbladder: Unremarkable gallbladder. No biliary ductal dilatation Spleen: Unremarkable spleen. Pancreas: No peripancreatic inflammatory changes. Adrenal Glands: No focal adrenal abnormalities identified. Kidneys: No hydronephrosis. Tiny left renal cyst. GI/Bowel: Stomach: The stomach is nondistended. Small bowel: No evidence of small bowel obstruction. Colon: Inflammatory changes near this splenic flexure the colon where there diverticula favoring mild acute diverticulitis. No loculated fluid collections or free air. Appendix: Normal appearance of the appendix. Pelvis: Fluid-filled endometrial cavity. No free fluid in the pelvis. Peritoneum/Retroperitoneum: Atherosclerosis of the abdominal aorta. No retroperitoneal lymphadenopathy by CT criteria. Bones/Soft Tissues: No acute findings within the soft tissues or osseous structures. Mild acute diverticulitis of the splenic flexure of the colon. No loculated fluid collections or free air. IMPRESSION:     1. Acute, uncomplicated, diverticulitis  -patient has a history of recurrent episodes of acute uncomplicated diverticulitis dating back to at least 2018 with reported 8-10 episodes. Will need to rule out other underlying pathology such as IBD or CRC-discussed with patient    2. LLQ abdominal pain secondary to #1    3. Chronic loose stools. May be secondary to diarrhea predominant IBS vs microscopic colitis versus other    4. Diverticuloses     Old records, labs and imaging reviewed. PLAN   1. Continue antibiotics with Cipro and Flagyl  2. Patient will need colonoscopy in 6 to 8 weeks and/or after are diverticulitis is healed to rule out IBD, CRC, or other underlying pathology.-Discussed with patient  3. CL diet and advance as tolerated  4. GI will sign off. This plan was formulated in collaboration with Dr. Jair Capellan  Thank you for allowing us to participate in the care of your patient. Electronically signed by: KENNETH Jha CNP on 7/27/2022 at 2:00 PM     Please note that this note was generated using a voice recognition dictation software. Although every effort was made to ensure the accuracy of this automated transcription, some errors in transcription may have occurred.

## 2022-07-27 NOTE — PLAN OF CARE
Problem: Discharge Planning  Goal: Discharge to home or other facility with appropriate resources  Outcome: Progressing Towards Goal  Flowsheets (Taken 7/26/2022 1700 by Clari Hill RN)  Discharge to home or other facility with appropriate resources: Identify barriers to discharge with patient and caregiver     Problem: Pain  Goal: Verbalizes/displays adequate comfort level or baseline comfort level  Outcome: Progressing Towards Goal

## 2022-07-28 LAB
ABSOLUTE EOS #: 0.08 K/UL (ref 0–0.44)
ABSOLUTE IMMATURE GRANULOCYTE: 0.03 K/UL (ref 0–0.3)
ABSOLUTE LYMPH #: 2.52 K/UL (ref 1.1–3.7)
ABSOLUTE MONO #: 0.56 K/UL (ref 0.1–1.2)
ANION GAP SERPL CALCULATED.3IONS-SCNC: 13 MMOL/L (ref 9–17)
BASOPHILS # BLD: 1 % (ref 0–2)
BASOPHILS ABSOLUTE: 0.05 K/UL (ref 0–0.2)
BUN BLDV-MCNC: 4 MG/DL (ref 6–20)
CALCIUM SERPL-MCNC: 8.6 MG/DL (ref 8.6–10.4)
CHLORIDE BLD-SCNC: 107 MMOL/L (ref 98–107)
CO2: 19 MMOL/L (ref 20–31)
CREAT SERPL-MCNC: 0.61 MG/DL (ref 0.5–0.9)
EOSINOPHILS RELATIVE PERCENT: 1 % (ref 1–4)
GFR AFRICAN AMERICAN: >60 ML/MIN
GFR NON-AFRICAN AMERICAN: >60 ML/MIN
GFR SERPL CREATININE-BSD FRML MDRD: ABNORMAL ML/MIN/{1.73_M2}
GLUCOSE BLD-MCNC: 77 MG/DL (ref 70–99)
HCT VFR BLD CALC: 34.3 % (ref 36.3–47.1)
HEMOGLOBIN: 11.2 G/DL (ref 11.9–15.1)
IMMATURE GRANULOCYTES: 0 %
LYMPHOCYTES # BLD: 34 % (ref 24–43)
MCH RBC QN AUTO: 28.8 PG (ref 25.2–33.5)
MCHC RBC AUTO-ENTMCNC: 32.7 G/DL (ref 28.4–34.8)
MCV RBC AUTO: 88.2 FL (ref 82.6–102.9)
MONOCYTES # BLD: 8 % (ref 3–12)
NRBC AUTOMATED: 0 PER 100 WBC
PDW BLD-RTO: 14.6 % (ref 11.8–14.4)
PLATELET # BLD: 226 K/UL (ref 138–453)
PMV BLD AUTO: 9.4 FL (ref 8.1–13.5)
POTASSIUM SERPL-SCNC: 3.9 MMOL/L (ref 3.7–5.3)
RBC # BLD: 3.89 M/UL (ref 3.95–5.11)
RBC # BLD: ABNORMAL 10*6/UL
REASON FOR REJECTION: NORMAL
SEG NEUTROPHILS: 56 % (ref 36–65)
SEGMENTED NEUTROPHILS ABSOLUTE COUNT: 4.19 K/UL (ref 1.5–8.1)
SODIUM BLD-SCNC: 139 MMOL/L (ref 135–144)
WBC # BLD: 7.4 K/UL (ref 3.5–11.3)
ZZ NTE CLEAN UP: ORDERED TEST: NORMAL
ZZ NTE WITH NAME CLEAN UP: SPECIMEN SOURCE: NORMAL

## 2022-07-28 PROCEDURE — 6370000000 HC RX 637 (ALT 250 FOR IP)

## 2022-07-28 PROCEDURE — 2580000003 HC RX 258

## 2022-07-28 PROCEDURE — 2500000003 HC RX 250 WO HCPCS

## 2022-07-28 PROCEDURE — 99232 SBSQ HOSP IP/OBS MODERATE 35: CPT | Performed by: INTERNAL MEDICINE

## 2022-07-28 PROCEDURE — 85025 COMPLETE CBC W/AUTO DIFF WBC: CPT

## 2022-07-28 PROCEDURE — 36415 COLL VENOUS BLD VENIPUNCTURE: CPT

## 2022-07-28 PROCEDURE — 80048 BASIC METABOLIC PNL TOTAL CA: CPT

## 2022-07-28 PROCEDURE — 6360000002 HC RX W HCPCS

## 2022-07-28 PROCEDURE — 1200000000 HC SEMI PRIVATE

## 2022-07-28 RX ADMIN — CIPROFLOXACIN 400 MG: 2 INJECTION, SOLUTION INTRAVENOUS at 10:41

## 2022-07-28 RX ADMIN — MORPHINE SULFATE 2 MG: 2 INJECTION, SOLUTION INTRAMUSCULAR; INTRAVENOUS at 00:45

## 2022-07-28 RX ADMIN — METRONIDAZOLE 500 MG: 500 INJECTION, SOLUTION INTRAVENOUS at 12:02

## 2022-07-28 RX ADMIN — CIPROFLOXACIN 400 MG: 2 INJECTION, SOLUTION INTRAVENOUS at 22:32

## 2022-07-28 RX ADMIN — THYROID, PORCINE 60 MG: 60 TABLET ORAL at 05:50

## 2022-07-28 RX ADMIN — PANTOPRAZOLE SODIUM 40 MG: 40 TABLET, DELAYED RELEASE ORAL at 05:51

## 2022-07-28 RX ADMIN — MORPHINE SULFATE 2 MG: 4 INJECTION, SOLUTION INTRAMUSCULAR; INTRAVENOUS at 10:47

## 2022-07-28 RX ADMIN — METRONIDAZOLE 500 MG: 500 INJECTION, SOLUTION INTRAVENOUS at 04:00

## 2022-07-28 RX ADMIN — SODIUM CHLORIDE: 9 INJECTION, SOLUTION INTRAVENOUS at 00:41

## 2022-07-28 RX ADMIN — MORPHINE SULFATE 2 MG: 4 INJECTION, SOLUTION INTRAMUSCULAR; INTRAVENOUS at 23:35

## 2022-07-28 RX ADMIN — ONDANSETRON 4 MG: 2 INJECTION INTRAMUSCULAR; INTRAVENOUS at 10:47

## 2022-07-28 RX ADMIN — ONDANSETRON 4 MG: 2 INJECTION INTRAMUSCULAR; INTRAVENOUS at 17:35

## 2022-07-28 RX ADMIN — MORPHINE SULFATE 2 MG: 4 INJECTION, SOLUTION INTRAMUSCULAR; INTRAVENOUS at 17:26

## 2022-07-28 RX ADMIN — ONDANSETRON 4 MG: 2 INJECTION INTRAMUSCULAR; INTRAVENOUS at 23:35

## 2022-07-28 RX ADMIN — METRONIDAZOLE 500 MG: 500 INJECTION, SOLUTION INTRAVENOUS at 21:21

## 2022-07-28 ASSESSMENT — PAIN DESCRIPTION - LOCATION
LOCATION: ABDOMEN

## 2022-07-28 ASSESSMENT — PAIN SCALES - GENERAL
PAINLEVEL_OUTOF10: 7
PAINLEVEL_OUTOF10: 5
PAINLEVEL_OUTOF10: 7
PAINLEVEL_OUTOF10: 7
PAINLEVEL_OUTOF10: 5
PAINLEVEL_OUTOF10: 7

## 2022-07-28 ASSESSMENT — PAIN DESCRIPTION - DESCRIPTORS
DESCRIPTORS: DISCOMFORT
DESCRIPTORS: DISCOMFORT
DESCRIPTORS: STABBING
DESCRIPTORS: ACHING;DISCOMFORT
DESCRIPTORS: PRESSURE

## 2022-07-28 ASSESSMENT — PAIN DESCRIPTION - FREQUENCY: FREQUENCY: CONTINUOUS

## 2022-07-28 ASSESSMENT — PAIN DESCRIPTION - ORIENTATION
ORIENTATION: LEFT

## 2022-07-28 ASSESSMENT — PAIN DESCRIPTION - ONSET: ONSET: ON-GOING

## 2022-07-28 ASSESSMENT — PAIN DESCRIPTION - PAIN TYPE: TYPE: ACUTE PAIN

## 2022-07-28 NOTE — PROGRESS NOTES
[facility]  Internal Medicine Teaching Residency Program  Inpatient Daily Progress Note  ______________________________________________________________________________    Patient: Roz Jean-Baptiste  YOB: 1975   RSS:[id number]    Acct: [de-identified]     Room: 12/0-36  Admit date: 7/26/2022  Today's date: 07/28/22  Number of days in the hospital: 2    SUBJECTIVE   Admitting Diagnosis: Acute diverticulitis  Chief complaint: Abdominal pain, nausea. -Pt examined at bedside. Chart & results reviewed.   -No acute events reported overnight. Vitals stable. Labs reviewed. -Patient was evaluated by Gastroenterology. Patient will need colonoscopy in 6 to 8 weeks and/or after are diverticulitis is healed to rule out IBD, CRC, or other underlying pathology.-Discussed with patient. GI signed off  -patient is on Cipro and flagyl.  -Patient feels that she is doing much better than when she came it , reports that abdominal pain is still present but tolerable. -She denies any new complaints/symptoms at this time. ROS:  Constitutional:  negative for chills, fevers, sweats  Respiratory:  negative for cough, dyspnea on exertion, hemoptysis, shortness of breath, wheezing  Cardiovascular:  negative for chest pain, chest pressure/discomfort, lower extremity edema, palpitations  Gastrointestinal:  negative for abdominal pain, constipation, diarrhea, nausea, vomiting  Neurological:  negative for dizziness, headache  BRIEF HISTORY        The patient is a pleasant 40 y.o. female presents with a chief complaint of nausea, and left sided lower abdominal pain that started last night. Abdominal pain is located in left lower quadrant, constant, severe with 7/10 in intensity, sharp in nature, without radiation, associated with nausea without vomiting and abdominal distention. She denies any aggravating or relieving factors.   Patient has a history of multiple episodes of acute exudates or cobblestoning. No thrush was noted. Neck: Supple without thyromegaly. No elevated JVP. Trachea was midline. Respiratory: Chest was symmetrical without dullness to percussion. Breath sounds bilaterally were clear to auscultation. There were no wheezes, rhonchi or rales. There is no intercostal retraction or use of accessory muscles. No egophony noted. Cardiovascular: Regular without murmur, clicks, gallops or rubs. Abdomen: Slightly rounded and soft without organomegaly. No rebound, rigidity or guarding was appreciated. Lymphatic: No lymphadenopathy. Musculoskeletal: Normal curvature of the spine. No gross muscle weakness. Extremities:  No lower extremity edema, ulcerations, tenderness, varicosities or erythema. Muscle size, tone and strength are normal.  No involuntary movements are noted. Skin:  Warm and dry. Good color, turgor and pigmentation. No lesions or scars.   No cyanosis or clubbing  Neurological/Psychiatric: The patient's general behavior, level of consciousness, thought content and emotional status is normal.        Medications:  Scheduled Medications:    sodium chloride flush  5-40 mL IntraVENous 2 times per day    enoxaparin  40 mg SubCUTAneous Daily    pantoprazole  40 mg Oral QAM AC    ciprofloxacin  400 mg IntraVENous Q12H    metroNIDAZOLE  500 mg IntraVENous Q8H    thyroid  60 mg Oral QAM AC     Continuous Infusions:    sodium chloride      sodium chloride 100 mL/hr at 07/28/22 0041     PRN Medicationssodium chloride flush, 5-40 mL, PRN  sodium chloride, , PRN  ondansetron, 4 mg, Q8H PRN   Or  ondansetron, 4 mg, Q6H PRN  polyethylene glycol, 17 g, Daily PRN  acetaminophen, 650 mg, Q6H PRN   Or  acetaminophen, 650 mg, Q6H PRN  potassium chloride, 10 mEq, PRN  magnesium sulfate, 2,000 mg, PRN  morphine, 2 mg, Q4H PRN      Diagnostic Labs:  CBC:   Recent Labs     07/26/22  0850 07/27/22  0555 07/28/22  0706   WBC 13.6* 10.8 7.4   RBC 4.49 4.02 3.89*   HGB 12.5 11.7* 11.2*   HCT 38.4 38.4 34.3*   MCV 85.5 95.5 88.2   RDW 14.6* 14.9* 14.6*    245 226     BMP:   Recent Labs     07/26/22  0850 07/27/22  0555 07/28/22  0401    140 139   K 3.4* 4.0 3.9    107 107   CO2 21 21 19*   BUN 5* 4* 4*   CREATININE 0.63 0.64 0.61     BNP: No results for input(s): BNP in the last 72 hours. PT/INR: No results for input(s): PROTIME, INR in the last 72 hours. APTT: No results for input(s): APTT in the last 72 hours. CARDIAC ENZYMES: No results for input(s): CKMB, CKMBINDEX, TROPONINI in the last 72 hours. Invalid input(s): CKTOTAL;3  FASTING LIPID PANEL:No results found for: CHOL, HDL, TRIG  LIVER PROFILE:   Recent Labs     07/26/22  0850   AST 19   ALT 13   BILITOT 0.80   ALKPHOS 70      MICROBIOLOGY: No results found for: CULTURE    Imaging:    CT ABDOMEN PELVIS W IV CONTRAST Additional Contrast? None    Result Date: 7/26/2022  1. Interval resolution of acute diverticulitis of splenic flexure. 2. Interval development of a more severe uncomplicated acute diverticulitis of the distal descending colon. ASSESSMENT & PLAN     ASSESSMENT / PLAN:   Principal Problem:    Acute diverticulitis  Active Problems:    Diverticulosis    Hypokalemia    Diverticulitis of colon    LLQ abdominal pain    Loose stools    Hypothyroidism  Resolved Problems:    Diverticulitis    Acute Diverticulitis: Leukocytosis resolved. WBC today is 7.4., CT Abdomen showed interval resolution of acute diverticulitis of splenic flexure. Started Ciprofloxacin 400 mg IV BID and Flagyl 600 mg IV TID. Will continue Abx for 10 days. Start on IVF maintenance NS @ 100 mL/hr. Will start on clear liquid diet as tolerated. Zofran for nausea. Morphine 2 mg IV q4 hours for pain. Serial abdominal exam. Bowel regimen as needed for constipation. Hypothyroidism: TSH pending, Last TSH 5/22: 1.91. Resume Armor 60 mg daily. Doesn't tolerate Synthroid. Hypokalemia: K 3.9 today. Monitor BMP daily, replace as needed. Hx of diverticulosis with multiple diverticulitis: patient has chronic diarrhea, was constipated in past when diverticulitis flare up. Follow up with GI as outpatient for colonoscopy. No prior colonoscopy in past.Patient will need colonoscopy in 6 to 8 weeks and/or after are diverticulitis is healed to rule out IBD, CRC, or other underlying pathology.-Discussed with patient. Chronic diarrhea: GI molecular stool studies. Follow up with GI as outpatient. DVT ppx: Lovenox  GI ppx: Protonix     PT/OT: Following   Discharge Planning:  consulted, will follow up. Ramona Mccormick MD  Internal Medicine Resident, PGY-1  [facility]; [place], [place]  7/28/2022, 7:56 AM  Attending Physician Statement  I have discussed the care of [facility], including pertinent history and exam findings,  with the resident. I have seen and examined the patient and the key elements of all parts of the encounter have been performed by me. I agree with the assessment, plan and orders as documented by the resident with additions . Treatment plan Discussed with nursing staff in detail , all questions answered . Electronically signed by Mark Leach MD on   7/28/22 at 5:03 PM EDT    Please note that this chart was generated using voice recognition Dragon dictation software. Although every effort was made to ensure the accuracy of this automated transcription, some errors in transcription may have occurred.

## 2022-07-29 VITALS
WEIGHT: 192.68 LBS | SYSTOLIC BLOOD PRESSURE: 123 MMHG | HEART RATE: 63 BPM | DIASTOLIC BLOOD PRESSURE: 73 MMHG | OXYGEN SATURATION: 97 % | TEMPERATURE: 98.4 F | HEIGHT: 63 IN | BODY MASS INDEX: 34.14 KG/M2 | RESPIRATION RATE: 16 BRPM

## 2022-07-29 PROBLEM — K57.92 ACUTE DIVERTICULITIS: Status: RESOLVED | Noted: 2022-07-26 | Resolved: 2022-07-29

## 2022-07-29 LAB
ABSOLUTE EOS #: 0.11 K/UL (ref 0–0.44)
ABSOLUTE IMMATURE GRANULOCYTE: <0.03 K/UL (ref 0–0.3)
ABSOLUTE LYMPH #: 2.62 K/UL (ref 1.1–3.7)
ABSOLUTE MONO #: 0.52 K/UL (ref 0.1–1.2)
ANION GAP SERPL CALCULATED.3IONS-SCNC: 12 MMOL/L (ref 9–17)
BASOPHILS # BLD: 1 % (ref 0–2)
BASOPHILS ABSOLUTE: 0.05 K/UL (ref 0–0.2)
BUN BLDV-MCNC: 4 MG/DL (ref 6–20)
CALCIUM SERPL-MCNC: 8.6 MG/DL (ref 8.6–10.4)
CAMPYLOBACTER PCR: NORMAL
CHLORIDE BLD-SCNC: 110 MMOL/L (ref 98–107)
CO2: 18 MMOL/L (ref 20–31)
CREAT SERPL-MCNC: 0.71 MG/DL (ref 0.5–0.9)
E COLI ENTEROTOXIGENIC PCR: NORMAL
EOSINOPHILS RELATIVE PERCENT: 2 % (ref 1–4)
GFR AFRICAN AMERICAN: >60 ML/MIN
GFR NON-AFRICAN AMERICAN: >60 ML/MIN
GFR SERPL CREATININE-BSD FRML MDRD: ABNORMAL ML/MIN/{1.73_M2}
GLUCOSE BLD-MCNC: 93 MG/DL (ref 70–99)
HCT VFR BLD CALC: 36.2 % (ref 36.3–47.1)
HEMOGLOBIN: 11.5 G/DL (ref 11.9–15.1)
IMMATURE GRANULOCYTES: 0 %
LYMPHOCYTES # BLD: 40 % (ref 24–43)
MAGNESIUM: 1.9 MG/DL (ref 1.6–2.6)
MCH RBC QN AUTO: 28.9 PG (ref 25.2–33.5)
MCHC RBC AUTO-ENTMCNC: 31.8 G/DL (ref 28.4–34.8)
MCV RBC AUTO: 91 FL (ref 82.6–102.9)
MONOCYTES # BLD: 8 % (ref 3–12)
NRBC AUTOMATED: 0 PER 100 WBC
PDW BLD-RTO: 14.3 % (ref 11.8–14.4)
PLATELET # BLD: 253 K/UL (ref 138–453)
PLESIOMONAS SHIGELLOIDES PCR: NORMAL
PMV BLD AUTO: 9.7 FL (ref 8.1–13.5)
POTASSIUM SERPL-SCNC: 3.4 MMOL/L (ref 3.7–5.3)
RBC # BLD: 3.98 M/UL (ref 3.95–5.11)
SALMONELLA PCR: NORMAL
SEG NEUTROPHILS: 49 % (ref 36–65)
SEGMENTED NEUTROPHILS ABSOLUTE COUNT: 3.26 K/UL (ref 1.5–8.1)
SHIGATOXIN GENE PCR: NORMAL
SHIGELLA SP PCR: NORMAL
SODIUM BLD-SCNC: 140 MMOL/L (ref 135–144)
SPECIMEN DESCRIPTION: NORMAL
VIBRIO PCR: NORMAL
WBC # BLD: 6.6 K/UL (ref 3.5–11.3)
YERSINIA ENTEROCOLITICA PCR: NORMAL

## 2022-07-29 PROCEDURE — 36415 COLL VENOUS BLD VENIPUNCTURE: CPT

## 2022-07-29 PROCEDURE — 87506 IADNA-DNA/RNA PROBE TQ 6-11: CPT

## 2022-07-29 PROCEDURE — 6360000002 HC RX W HCPCS

## 2022-07-29 PROCEDURE — 99238 HOSP IP/OBS DSCHRG MGMT 30/<: CPT | Performed by: INTERNAL MEDICINE

## 2022-07-29 PROCEDURE — 2580000003 HC RX 258

## 2022-07-29 PROCEDURE — 80048 BASIC METABOLIC PNL TOTAL CA: CPT

## 2022-07-29 PROCEDURE — 2500000003 HC RX 250 WO HCPCS

## 2022-07-29 PROCEDURE — 83735 ASSAY OF MAGNESIUM: CPT

## 2022-07-29 PROCEDURE — 85025 COMPLETE CBC W/AUTO DIFF WBC: CPT

## 2022-07-29 PROCEDURE — 6370000000 HC RX 637 (ALT 250 FOR IP): Performed by: STUDENT IN AN ORGANIZED HEALTH CARE EDUCATION/TRAINING PROGRAM

## 2022-07-29 PROCEDURE — 6370000000 HC RX 637 (ALT 250 FOR IP)

## 2022-07-29 RX ORDER — POTASSIUM CHLORIDE 20 MEQ/1
40 TABLET, EXTENDED RELEASE ORAL ONCE
Status: COMPLETED | OUTPATIENT
Start: 2022-07-29 | End: 2022-07-29

## 2022-07-29 RX ORDER — METRONIDAZOLE 500 MG/1
500 TABLET ORAL 3 TIMES DAILY
Qty: 21 TABLET | Refills: 0 | Status: CANCELLED | OUTPATIENT
Start: 2022-07-29 | End: 2022-08-05

## 2022-07-29 RX ORDER — ACETAMINOPHEN 500 MG
500 TABLET ORAL 4 TIMES DAILY PRN
Qty: 20 TABLET | Refills: 0 | Status: SHIPPED | OUTPATIENT
Start: 2022-07-29 | End: 2022-08-03

## 2022-07-29 RX ORDER — CIPROFLOXACIN 500 MG/1
500 TABLET, FILM COATED ORAL 2 TIMES DAILY
Qty: 14 TABLET | Refills: 0 | Status: CANCELLED | OUTPATIENT
Start: 2022-07-29 | End: 2022-08-05

## 2022-07-29 RX ORDER — AMOXICILLIN AND CLAVULANATE POTASSIUM 500; 125 MG/1; MG/1
1 TABLET, FILM COATED ORAL 3 TIMES DAILY
Qty: 30 TABLET | Refills: 0 | Status: SHIPPED | OUTPATIENT
Start: 2022-07-29 | End: 2022-08-08

## 2022-07-29 RX ORDER — ONDANSETRON 4 MG/1
4 TABLET, ORALLY DISINTEGRATING ORAL EVERY 8 HOURS PRN
Qty: 15 TABLET | Refills: 0 | Status: SHIPPED | OUTPATIENT
Start: 2022-07-29 | End: 2022-08-03

## 2022-07-29 RX ORDER — PANTOPRAZOLE SODIUM 40 MG/1
40 TABLET, DELAYED RELEASE ORAL
Qty: 10 TABLET | Refills: 0 | Status: SHIPPED | OUTPATIENT
Start: 2022-07-30 | End: 2022-08-09

## 2022-07-29 RX ADMIN — METRONIDAZOLE 500 MG: 500 INJECTION, SOLUTION INTRAVENOUS at 13:19

## 2022-07-29 RX ADMIN — ONDANSETRON 4 MG: 2 INJECTION INTRAMUSCULAR; INTRAVENOUS at 12:08

## 2022-07-29 RX ADMIN — ONDANSETRON 4 MG: 2 INJECTION INTRAMUSCULAR; INTRAVENOUS at 05:26

## 2022-07-29 RX ADMIN — POTASSIUM CHLORIDE 40 MEQ: 1500 TABLET, EXTENDED RELEASE ORAL at 09:57

## 2022-07-29 RX ADMIN — THYROID, PORCINE 60 MG: 60 TABLET ORAL at 07:58

## 2022-07-29 RX ADMIN — MORPHINE SULFATE 2 MG: 4 INJECTION, SOLUTION INTRAMUSCULAR; INTRAVENOUS at 05:26

## 2022-07-29 RX ADMIN — METRONIDAZOLE 500 MG: 500 INJECTION, SOLUTION INTRAVENOUS at 05:30

## 2022-07-29 RX ADMIN — CIPROFLOXACIN 400 MG: 2 INJECTION, SOLUTION INTRAVENOUS at 10:34

## 2022-07-29 RX ADMIN — MORPHINE SULFATE 2 MG: 4 INJECTION, SOLUTION INTRAMUSCULAR; INTRAVENOUS at 10:09

## 2022-07-29 RX ADMIN — SODIUM CHLORIDE, PRESERVATIVE FREE 10 ML: 5 INJECTION INTRAVENOUS at 07:58

## 2022-07-29 ASSESSMENT — PAIN SCALES - GENERAL
PAINLEVEL_OUTOF10: 7
PAINLEVEL_OUTOF10: 5

## 2022-07-29 NOTE — DISCHARGE INSTRUCTIONS
Follow-up outpatient with PCP in 1 to 2 weeks for posthospital follow-up visit. Patient probably needs colonoscopy after acute flareup has been resolved, need to follow-up with GI outpatient. Pt received IV Cipro and Flagyl while in patient, but she doesn't want to take po flagyl.  Discharged on Augmentin 500 x 10 days  Patient advised to come back to ED if symptoms worsen

## 2022-07-29 NOTE — PROGRESS NOTES
[facility]  Internal Medicine Teaching Residency Program  Inpatient Daily Progress Note  ______________________________________________________________________________    Patient: Gena Milian  YOB: 1975   KII:[id number]    Acct: [de-identified]     Room: 12/0-36  Admit date: 7/26/2022  Today's date: 07/29/22  Number of days in the hospital: 3    SUBJECTIVE   Admitting Diagnosis: Acute diverticulitis  Chief complaint: Abdominal pain, nausea. -Pt examined at bedside. Chart & results reviewed. Blood pressure 123/73, heart rate 63, saturating well on room air, remained afebrile overnight. Labs reviewed, potassium 3.4 replaced, blood sugars 93. No leukocytosis, Hb 11.5. Patient was evaluated by GI, recommended continue antibiotics with Cipro and Flagyl. Patient will need colonoscopy in 2 to 6 weeks and/or after acute flareup of diverticulitis is resolved. Pt started that she will not take Flagyl, will put her on Augmentin on discharge. Still c/o mild abdominal tenderness and diarrhea. Denies any nausea or vomiting. Patient is tolerating regular diet and also getting normal saline at 100 mL/h. Plan is to discharge patient today, patient will be going home with p.o. antibiotics. ROS:  Constitutional:  negative for chills, fevers, sweats  Respiratory:  negative for cough, dyspnea on exertion, hemoptysis, shortness of breath, wheezing  Cardiovascular:  negative for chest pain, chest pressure/discomfort, lower extremity edema, palpitations  Gastrointestinal:  negative for abdominal pain, constipation, diarrhea, nausea, vomiting  Neurological:  negative for dizziness, headache  BRIEF HISTORY        The patient is a pleasant 40 y.o. female presents with a chief complaint of nausea, and left sided lower abdominal pain that started last night.   Abdominal pain is located in left lower quadrant, constant, severe with 7/10 in intensity, sharp in nature, without radiation, associated with nausea without vomiting and abdominal distention. She denies any aggravating or relieving factors. Patient has a history of multiple episodes of acute diverticulitis requiring IV antibiotics since , has a history of diverticulosis. Earlier this month, on 2022, patient came to [facility] ER for similar complaint of abdominal pain and was diagnosed with mild acute diverticulitis of the splenic flexure of the colon and was discharged on oral Augmentin. Patient reported that she completed 2 weeks course of oral Augmentin. She has chronic diarrhea and denies constipation, vomiting, fever, chills, chest pain, dysuria, urinary frequency or urgency. Patient has PMH significant of hypothyroidism (on Armor 60 mg daily) and Hx of diverticulosis with multiple diverticulitis in past.     In the ED, patient was afebrile, hypertensive /96, saturating well on room air. On exam, patient is in mild painful distress. Abdominal exam has BS+, distended, soft, tenderness to palpation on LLQ. Lung and heart exam within normal limits. CT Abdomen Pelvis W IV contrast shows interval resolution of acute diverticulitis of splenic flexure. Interval development of a severe uncomplicated acute diverticulitis of the distal descending colon. Significant labs,  K 3.4, Cr 0.63  WBC 13.6, glucose 113  UA- large Hgb, negative leukocyte esterase     Patient is admitted for severe uncomplicated acute diverticulitis of distal descending colon. OBJECTIVE     Vital Signs:  /73   Pulse 63   Temp 98.4 °F (36.9 °C) (Oral)   Resp 16   Ht 5' 3\" (1.6 m)   Wt 192 lb 10.9 oz (87.4 kg)   LMP  (LMP Unknown)   SpO2 97%   BMI 34.13 kg/m²     Temp (24hrs), Av.4 °F (36.9 °C), Min:98.1 °F (36.7 °C), Max:98.7 °F (37.1 °C)    No intake/output data recorded.     Physical Exam:  Constitutional: This is a well developed, well nourished, 30-34.9 - Obesity Grade I 40y.o. year old female who is alert, oriented, cooperative and in no apparent distress. Head:normocephalic and atraumatic. EENT:  PERRLA. No conjunctival injections. Septum was midline, mucosa was without erythema, exudates or cobblestoning. No thrush was noted. Neck: Supple without thyromegaly. No elevated JVP. Trachea was midline. Respiratory: Chest was symmetrical without dullness to percussion. Breath sounds bilaterally were clear to auscultation. There were no wheezes, rhonchi or rales. There is no intercostal retraction or use of accessory muscles. No egophony noted. Cardiovascular: Regular without murmur, clicks, gallops or rubs. Abdomen: Slightly rounded and soft without organomegaly. No rebound, rigidity or guarding was appreciated. Lymphatic: No lymphadenopathy. Musculoskeletal: Normal curvature of the spine. No gross muscle weakness. Extremities:  No lower extremity edema, ulcerations, tenderness, varicosities or erythema. Muscle size, tone and strength are normal.  No involuntary movements are noted. Skin:  Warm and dry. Good color, turgor and pigmentation. No lesions or scars.   No cyanosis or clubbing  Neurological/Psychiatric: The patient's general behavior, level of consciousness, thought content and emotional status is normal.        Medications:  Scheduled Medications:    sodium chloride flush  5-40 mL IntraVENous 2 times per day    enoxaparin  40 mg SubCUTAneous Daily    pantoprazole  40 mg Oral QAM AC    ciprofloxacin  400 mg IntraVENous Q12H    metroNIDAZOLE  500 mg IntraVENous Q8H    thyroid  60 mg Oral QAM AC     Continuous Infusions:    sodium chloride      sodium chloride 100 mL/hr at 07/28/22 0041     PRN Medicationssodium chloride flush, 5-40 mL, PRN  sodium chloride, , PRN  ondansetron, 4 mg, Q8H PRN   Or  ondansetron, 4 mg, Q6H PRN  polyethylene glycol, 17 g, Daily PRN  acetaminophen, 650 mg, Q6H PRN   Or  acetaminophen, 650 mg, Q6H PRN  potassium chloride, 10 mEq, PRN  magnesium sulfate, 2,000 mg, PRN  morphine, 2 mg, Q4H PRN      Diagnostic Labs:  CBC:   Recent Labs     07/27/22  0555 07/28/22  0706 07/29/22  0604   WBC 10.8 7.4 6.6   RBC 4.02 3.89* 3.98   HGB 11.7* 11.2* 11.5*   HCT 38.4 34.3* 36.2*   MCV 95.5 88.2 91.0   RDW 14.9* 14.6* 14.3    226 253       BMP:   Recent Labs     07/27/22  0555 07/28/22  0401 07/29/22  0604    139 140   K 4.0 3.9 3.4*    107 110*   CO2 21 19* 18*   BUN 4* 4* 4*   CREATININE 0.64 0.61 0.71       BNP: No results for input(s): BNP in the last 72 hours. PT/INR: No results for input(s): PROTIME, INR in the last 72 hours. APTT: No results for input(s): APTT in the last 72 hours. CARDIAC ENZYMES: No results for input(s): CKMB, CKMBINDEX, TROPONINI in the last 72 hours. Invalid input(s): CKTOTAL;3  FASTING LIPID PANEL:No results found for: CHOL, HDL, TRIG  LIVER PROFILE:   No results for input(s): AST, ALT, ALB, BILIDIR, BILITOT, ALKPHOS in the last 72 hours. MICROBIOLOGY: No results found for: CULTURE    Imaging:    CT ABDOMEN PELVIS W IV CONTRAST Additional Contrast? None    Result Date: 7/26/2022  1. Interval resolution of acute diverticulitis of splenic flexure. 2. Interval development of a more severe uncomplicated acute diverticulitis of the distal descending colon. ASSESSMENT & PLAN     ASSESSMENT / PLAN:   Principal Problem:    Acute diverticulitis  Active Problems:    Diverticulosis    Hypokalemia    Diverticulitis of colon    LLQ abdominal pain    Loose stools    Hypothyroidism  Resolved Problems:    Diverticulitis    Acute Diverticulitis: Leukocytosis resolved. S/p CT Abdomen showed interval resolution of acute diverticulitis of splenic flexure. Ciprofloxacin 400 mg IV BID and Flagyl 600 mg IV TID. Will continue Abx for 10 days. IVF maintenance NS @ 100 mL/hr. Zofran for nausea. Morphine 2 mg IV q4 hours for pain. Serial abdominal exam. Bowel regimen as needed for constipation. Tolerating regular diet.   We will switch IV antibiotics to p.o. on discharge. Pt not willing to take PO Flagyl, will discharge on Augmentin if OK with her. Hypothyroidism: TSH 2.26, Last TSH 5/22: 1.91. Continue armor 60 mg daily. Doesn't tolerate Synthroid. Hypokalemia: Monitor BMP daily, replace as needed. Hx of diverticulosis with multiple diverticulitis: patient has chronic diarrhea, was constipated in past when diverticulitis flare up. Follow up with GI as outpatient for colonoscopy. No prior colonoscopy in past.Patient will need colonoscopy in 6 to 8 weeks and/or after are diverticulitis is healed to rule out IBD, CRC, or other underlying pathology.-Discussed with patient. Chronic diarrhea: GI molecular stool studies. Follow up with GI as outpatient. DVT ppx: Lovenox  GI ppx: Protonix     PT/OT: Following   Discharge Planning: Patient probably going home today,  following. Evan Hester MD  Internal Medicine Resident, PGY-3  [address] [place], [place]  7/29/2022, 9:04 AM  Attending Physician Statement  I have discussed the care of [facility], including pertinent history and exam findings,  with the resident. I have seen and examined the patient and the key elements of all parts of the encounter have been performed by me. I agree with the assessment, plan and orders as documented by the resident with additions . Treatment plan Discussed with nursing staff in detail , all questions answered . Electronically signed by Lori Mott MD on   7/29/22 at 10:43 PM EDT    Please note that this chart was generated using voice recognition Dragon dictation software. Although every effort was made to ensure the accuracy of this automated transcription, some errors in transcription may have occurred.

## 2022-07-29 NOTE — PROGRESS NOTES
CLINICAL PHARMACY NOTE: MEDS TO BEDS    Total # of Prescriptions Filled: 3   The following medications were delivered to the patient:  Pantoprazole  Amox-clav  ondansetron    Additional Documentation:  Delivered to patient and one guest at 3:52pm. Paid $26 with [employer].  HR

## 2022-07-29 NOTE — PLAN OF CARE
Problem: Discharge Planning  Goal: Discharge to home or other facility with appropriate resources  7/29/2022 0226 by Todd Mccarthy RN  Outcome: Progressing  7/28/2022 1844 by Jackie Pacheco RN  Outcome: Progressing     Problem: Pain  Goal: Verbalizes/displays adequate comfort level or baseline comfort level  7/29/2022 0226 by Todd Mccarthy RN  Outcome: Progressing  7/28/2022 1844 by Jackie Pacheco RN  Outcome: Progressing

## 2022-07-29 NOTE — PLAN OF CARE
Problem: Discharge Planning  Goal: Discharge to home or other facility with appropriate resources  7/29/2022 1444 by Michelle Knowles RN  Outcome: Completed  7/29/2022 0226 by Nohemi Spencer RN  Outcome: Progressing     Problem: Pain  Goal: Verbalizes/displays adequate comfort level or baseline comfort level  7/29/2022 1444 by Michelle Knowles RN  Outcome: Completed  7/29/2022 0226 by Nohemi Spencer RN  Outcome: Progressing

## 2022-07-31 NOTE — DISCHARGE SUMMARY
[facility]     Department of Internal Medicine - Staff Internal Medicine Teaching Service    INPATIENT DISCHARGE SUMMARY      Patient Identification:  Jb Delarosa is a 40 y.o. female. :  1975  MRN: [id number]     Acct: [de-identified]   PCP: No primary care provider on file. Admit Date:  2022  Discharge date and time: 2022  3:56 PM   Attending Provider: No att. providers found                                     [facility] Problem Lists:  Principal Problem (Resolved):    Acute diverticulitis  Active Problems:    Diverticulosis    Hypokalemia    Diverticulitis of colon    LLQ abdominal pain    Loose stools    Hypothyroidism  Resolved Problems:    Diverticulitis      HOSPITAL STAY     Brief Inpatient course: The patient is a pleasant 40 y.o. female presents with a chief complaint of nausea, and left sided lower abdominal pain that started last night. Abdominal pain is located in left lower quadrant, constant, severe with 7/10 in intensity, sharp in nature, without radiation, associated with nausea without vomiting and abdominal distention. She denies any aggravating or relieving factors. Patient has a history of multiple episodes of acute diverticulitis requiring IV antibiotics since , has a history of diverticulosis. Earlier this month, on 2022, patient came to [facility] ER for similar complaint of abdominal pain and was diagnosed with mild acute diverticulitis of the splenic flexure of the colon and was discharged on oral Augmentin. Patient reported that she completed 2 weeks course of oral Augmentin. She has chronic diarrhea and denies constipation, vomiting, fever, chills, chest pain, dysuria, urinary frequency or urgency.       Patient has PMH significant of hypothyroidism (on Armor 60 mg daily) and Hx of diverticulosis with multiple diverticulitis in past.     In the ED, patient was afebrile, hypertensive /96, saturating well on room air. On exam, patient is in mild painful distress. Abdominal exam has BS+, distended, soft, tenderness to palpation on LLQ. Lung and heart exam within normal limits. CT Abdomen Pelvis W IV contrast shows interval resolution of acute diverticulitis of splenic flexure. Interval development of a severe uncomplicated acute diverticulitis of the distal descending colon. Significant labs,  K 3.4, Cr 0.63  WBC 13.6, glucose 113  UA- large Hgb, negative leukocyte esterase     Patient is admitted for severe uncomplicated acute diverticulitis of distal descending colon. During the course of patient's hospitalization, the patient was treated for acute flareup of diverticulitis with IV ciprofloxacin and Flagyl. Zofran for nausea and morphine for pain. Serial abdominal examination and bowel regimen as needed for constipation. The patient was switched to p.o. antibiotics on discharge. Gastroenterology was consulted for the patient's diverticulitis,, recommended follow-up outpatient for colonoscopy. No prior colonoscopy in the past, patient will need colonoscopy in 6 to 8 weeks after treatment for diverticulitis to rule out IBD, CRC, or other underlying pathology given patient history of chronic diarrhea. The patient electrolytes were replaced as required.     Procedures/ Significant Interventions:    N/A    Consults:     Consults:     Final Specialist Recommendations/Findings:   IP CONSULT TO INTERNAL MEDICINE  IP CONSULT TO CASE MANAGEMENT  IP CONSULT TO GI  IP CONSULT TO IV TEAM      Any Hospital Acquired Infections: none    Discharge Functional Status:  stable    DISCHARGE PLAN     Disposition: home    Patient Instructions:   Discharge Medication List as of 7/29/2022  2:44 PM        START taking these medications    Details   ondansetron (ZOFRAN-ODT) 4 MG disintegrating tablet Take 1 tablet by mouth every 8 hours as needed for Nausea or Vomiting, Disp-15 tablet, R-0Normal      pantoprazole (PROTONIX) 40 MG tablet Take 1 tablet by mouth every morning (before breakfast) for 10 days, Disp-10 tablet, R-0Normal      amoxicillin-clavulanate (AUGMENTIN) 500-125 MG per tablet Take 1 tablet by mouth in the morning and 1 tablet at noon and 1 tablet before bedtime. Do all this for 10 days. , Disp-30 tablet, R-0Normal      acetaminophen (TYLENOL) 500 MG tablet Take 1 tablet by mouth 4 times daily as needed for Pain, Disp-20 tablet, R-0Normal           CONTINUE these medications which have NOT CHANGED    Details   thyroid (ARMOUR) 60 MG tablet Take 60 mg by mouth in the morning. Historical Med             Activity: activity as tolerated    Diet: regular diet    Follow-up:    Andres Holm, [address] [facility]  [phone]    Call  Follow-up in office for history of recurrent acute diverticulitis. Will need eventual colonoscopy    Dano Moreno MD  [facility] [address] [phone]    Follow up in 1 week(s)  Post hospital follow-up visit      Patient Instructions: Follow-up outpatient with PCP in 1 to 2 weeks for posthospital follow-up visit. Patient probably needs colonoscopy after acute flareup has been resolved, need to follow-up with GI outpatient. Pt received IV Cipro and Flagyl while in patient, but she doesn't want to take po flagyl. Discharged on Augmentin 500 x 10 days  Patient advised to come back to ED if symptoms worsen  Follow up labs: N/A  Follow up imaging: Taya Cagle MD,   Internal Medicine Resident, PGY-1  [facility];  [place], [place]  7/30/2022, 8:17 PM

## 2022-11-15 ENCOUNTER — HOSPITAL ENCOUNTER (OUTPATIENT)
Age: 47
Setting detail: SPECIMEN
Discharge: HOME | End: 2022-11-15

## 2022-11-15 LAB
ALBUMIN SERPL-MCNC: 4.3 G/DL (ref 3.5–5.2)
ALBUMIN/GLOBULIN RATIO: 1.4 (ref 1–2.5)
ALP BLD-CCNC: 85 U/L (ref 35–104)
ALT SERPL-CCNC: 18 U/L (ref 5–33)
ANION GAP SERPL CALCULATED.3IONS-SCNC: 11 MMOL/L (ref 9–17)
AST SERPL-CCNC: 21 U/L
BILIRUB SERPL-MCNC: 0.3 MG/DL (ref 0.3–1.2)
BUN BLDV-MCNC: 5 MG/DL (ref 6–20)
CALCIUM SERPL-MCNC: 9.2 MG/DL (ref 8.6–10.4)
CHLORIDE BLD-SCNC: 105 MMOL/L (ref 98–107)
CO2: 22 MMOL/L (ref 20–31)
CREAT SERPL-MCNC: 0.78 MG/DL (ref 0.5–0.9)
GFR SERPL CREATININE-BSD FRML MDRD: >60 ML/MIN/1.73M2
GLUCOSE BLD-MCNC: 105 MG/DL (ref 70–99)
POTASSIUM SERPL-SCNC: 4.2 MMOL/L (ref 3.7–5.3)
SODIUM BLD-SCNC: 138 MMOL/L (ref 135–144)
T3 TOTAL: 138 NG/DL (ref 60–181)
THYROXINE, FREE: 0.81 NG/DL (ref 0.93–1.7)
TOTAL PROTEIN: 7.3 G/DL (ref 6.4–8.3)
TSH SERPL DL<=0.05 MIU/L-ACNC: 0.52 UIU/ML (ref 0.3–5)
VITAMIN D 25-HYDROXY: 22.9 NG/ML

## 2022-11-16 LAB
ESTIMATED AVERAGE GLUCOSE: 123 MG/DL
HBA1C MFR BLD: 5.9 % (ref 4–6)

## 2023-01-20 ENCOUNTER — HOSPITAL ENCOUNTER (INPATIENT)
Age: 48
LOS: 3 days | Discharge: HOME | DRG: 392 | End: 2023-01-23
Attending: EMERGENCY MEDICINE | Admitting: INTERNAL MEDICINE
Payer: COMMERCIAL

## 2023-01-20 ENCOUNTER — APPOINTMENT (OUTPATIENT)
Dept: CT IMAGING | Age: 48
DRG: 392 | End: 2023-01-20
Payer: COMMERCIAL

## 2023-01-20 DIAGNOSIS — K57.32 DIVERTICULITIS OF COLON: Primary | ICD-10-CM

## 2023-01-20 PROBLEM — K57.92 ACUTE DIVERTICULITIS: Status: ACTIVE | Noted: 2023-01-20

## 2023-01-20 LAB
ABSOLUTE EOS #: 0.06 K/UL (ref 0–0.44)
ABSOLUTE IMMATURE GRANULOCYTE: 0.03 K/UL (ref 0–0.3)
ABSOLUTE LYMPH #: 3.19 K/UL (ref 1.1–3.7)
ABSOLUTE MONO #: 0.71 K/UL (ref 0.1–1.2)
ALBUMIN SERPL-MCNC: 4.2 G/DL (ref 3.5–5.2)
ALP BLD-CCNC: 71 U/L (ref 35–104)
ALT SERPL-CCNC: 20 U/L (ref 5–33)
ANION GAP SERPL CALCULATED.3IONS-SCNC: 11 MMOL/L (ref 9–17)
AST SERPL-CCNC: 23 U/L
BACTERIA: ABNORMAL
BASOPHILS # BLD: 1 % (ref 0–2)
BASOPHILS ABSOLUTE: 0.06 K/UL (ref 0–0.2)
BILIRUB SERPL-MCNC: 0.9 MG/DL (ref 0.3–1.2)
BILIRUBIN URINE: NEGATIVE
BUN BLDV-MCNC: 6 MG/DL (ref 6–20)
BUN/CREAT BLD: 8 (ref 9–20)
CALCIUM SERPL-MCNC: 9.2 MG/DL (ref 8.6–10.4)
CHLORIDE BLD-SCNC: 109 MMOL/L (ref 98–107)
CO2: 22 MMOL/L (ref 20–31)
COLOR: YELLOW
CREAT SERPL-MCNC: 0.72 MG/DL (ref 0.5–0.9)
EOSINOPHILS RELATIVE PERCENT: 1 % (ref 1–4)
EPITHELIAL CELLS UA: ABNORMAL /HPF (ref 0–5)
GFR SERPL CREATININE-BSD FRML MDRD: >60 ML/MIN/1.73M2
GLUCOSE BLD-MCNC: 89 MG/DL (ref 70–99)
GLUCOSE URINE: NEGATIVE
HCG QUALITATIVE: NEGATIVE
HCT VFR BLD CALC: 45.9 % (ref 36.3–47.1)
HEMOGLOBIN: 15.5 G/DL (ref 11.9–15.1)
IMMATURE GRANULOCYTES: 0 %
KETONES, URINE: NEGATIVE
LEUKOCYTE ESTERASE, URINE: NEGATIVE
LIPASE: 20 U/L (ref 13–60)
LYMPHOCYTES # BLD: 27 % (ref 24–43)
MCH RBC QN AUTO: 29.5 PG (ref 25.2–33.5)
MCHC RBC AUTO-ENTMCNC: 33.8 G/DL (ref 28.4–34.8)
MCV RBC AUTO: 87.4 FL (ref 82.6–102.9)
MONOCYTES # BLD: 6 % (ref 3–12)
NITRITE, URINE: NEGATIVE
NRBC AUTOMATED: 0 PER 100 WBC
PDW BLD-RTO: 14.6 % (ref 11.8–14.4)
PH UA: 6 (ref 5–8)
PLATELET # BLD: 416 K/UL (ref 138–453)
PMV BLD AUTO: 12.4 FL (ref 8.1–13.5)
POTASSIUM SERPL-SCNC: 3.7 MMOL/L (ref 3.7–5.3)
PROTEIN UA: NEGATIVE
RBC # BLD: 5.25 M/UL (ref 3.95–5.11)
RBC # BLD: ABNORMAL 10*6/UL
RBC UA: ABNORMAL /HPF (ref 0–2)
SEG NEUTROPHILS: 65 % (ref 36–65)
SEGMENTED NEUTROPHILS ABSOLUTE COUNT: 7.63 K/UL (ref 1.5–8.1)
SODIUM BLD-SCNC: 142 MMOL/L (ref 135–144)
SPECIFIC GRAVITY UA: 1.01 (ref 1–1.03)
TOTAL PROTEIN: 7.1 G/DL (ref 6.4–8.3)
TURBIDITY: ABNORMAL
URINE HGB: ABNORMAL
UROBILINOGEN, URINE: NORMAL
WBC # BLD: 11.7 K/UL (ref 3.5–11.3)
WBC UA: ABNORMAL /HPF (ref 0–5)

## 2023-01-20 PROCEDURE — 96375 TX/PRO/DX INJ NEW DRUG ADDON: CPT

## 2023-01-20 PROCEDURE — 99221 1ST HOSP IP/OBS SF/LOW 40: CPT | Performed by: NURSE PRACTITIONER

## 2023-01-20 PROCEDURE — 84703 CHORIONIC GONADOTROPIN ASSAY: CPT

## 2023-01-20 PROCEDURE — 6360000002 HC RX W HCPCS

## 2023-01-20 PROCEDURE — 6360000002 HC RX W HCPCS: Performed by: NURSE PRACTITIONER

## 2023-01-20 PROCEDURE — 82272 OCCULT BLD FECES 1-3 TESTS: CPT

## 2023-01-20 PROCEDURE — 2580000003 HC RX 258: Performed by: NURSE PRACTITIONER

## 2023-01-20 PROCEDURE — 81001 URINALYSIS AUTO W/SCOPE: CPT

## 2023-01-20 PROCEDURE — 96376 TX/PRO/DX INJ SAME DRUG ADON: CPT

## 2023-01-20 PROCEDURE — 87493 C DIFF AMPLIFIED PROBE: CPT

## 2023-01-20 PROCEDURE — 74177 CT ABD & PELVIS W/CONTRAST: CPT

## 2023-01-20 PROCEDURE — 83690 ASSAY OF LIPASE: CPT

## 2023-01-20 PROCEDURE — 1200000000 HC SEMI PRIVATE

## 2023-01-20 PROCEDURE — 2500000003 HC RX 250 WO HCPCS

## 2023-01-20 PROCEDURE — 96374 THER/PROPH/DIAG INJ IV PUSH: CPT

## 2023-01-20 PROCEDURE — 2500000003 HC RX 250 WO HCPCS: Performed by: NURSE PRACTITIONER

## 2023-01-20 PROCEDURE — 87324 CLOSTRIDIUM AG IA: CPT

## 2023-01-20 PROCEDURE — 87449 NOS EACH ORGANISM AG IA: CPT

## 2023-01-20 PROCEDURE — 6360000004 HC RX CONTRAST MEDICATION: Performed by: EMERGENCY MEDICINE

## 2023-01-20 PROCEDURE — 99285 EMERGENCY DEPT VISIT HI MDM: CPT

## 2023-01-20 PROCEDURE — 80053 COMPREHEN METABOLIC PANEL: CPT

## 2023-01-20 PROCEDURE — 2580000003 HC RX 258: Performed by: EMERGENCY MEDICINE

## 2023-01-20 PROCEDURE — 2580000003 HC RX 258

## 2023-01-20 PROCEDURE — 83630 LACTOFERRIN FECAL (QUAL): CPT

## 2023-01-20 PROCEDURE — 96361 HYDRATE IV INFUSION ADD-ON: CPT

## 2023-01-20 PROCEDURE — 85025 COMPLETE CBC W/AUTO DIFF WBC: CPT

## 2023-01-20 PROCEDURE — 87506 IADNA-DNA/RNA PROBE TQ 6-11: CPT

## 2023-01-20 RX ORDER — MAGNESIUM SULFATE 1 G/100ML
1000 INJECTION INTRAVENOUS PRN
Status: DISCONTINUED | OUTPATIENT
Start: 2023-01-20 | End: 2023-01-23 | Stop reason: HOSPADM

## 2023-01-20 RX ORDER — ONDANSETRON 2 MG/ML
4 INJECTION INTRAMUSCULAR; INTRAVENOUS ONCE
Status: COMPLETED | OUTPATIENT
Start: 2023-01-20 | End: 2023-01-20

## 2023-01-20 RX ORDER — SODIUM CHLORIDE 0.9 % (FLUSH) 0.9 %
10 SYRINGE (ML) INJECTION PRN
Status: DISCONTINUED | OUTPATIENT
Start: 2023-01-20 | End: 2023-01-23 | Stop reason: HOSPADM

## 2023-01-20 RX ORDER — CIPROFLOXACIN 2 MG/ML
400 INJECTION, SOLUTION INTRAVENOUS EVERY 12 HOURS
Status: DISCONTINUED | OUTPATIENT
Start: 2023-01-20 | End: 2023-01-23 | Stop reason: HOSPADM

## 2023-01-20 RX ORDER — MORPHINE SULFATE 2 MG/ML
2 INJECTION, SOLUTION INTRAMUSCULAR; INTRAVENOUS EVERY 4 HOURS PRN
Status: DISCONTINUED | OUTPATIENT
Start: 2023-01-20 | End: 2023-01-20

## 2023-01-20 RX ORDER — HYDROMORPHONE HYDROCHLORIDE 1 MG/ML
1 INJECTION, SOLUTION INTRAMUSCULAR; INTRAVENOUS; SUBCUTANEOUS EVERY 4 HOURS PRN
Status: DISCONTINUED | OUTPATIENT
Start: 2023-01-20 | End: 2023-01-23 | Stop reason: HOSPADM

## 2023-01-20 RX ORDER — METRONIDAZOLE 500 MG/100ML
500 INJECTION, SOLUTION INTRAVENOUS EVERY 8 HOURS
Status: DISCONTINUED | OUTPATIENT
Start: 2023-01-20 | End: 2023-01-23 | Stop reason: HOSPADM

## 2023-01-20 RX ORDER — ACETAMINOPHEN 325 MG/1
650 TABLET ORAL EVERY 6 HOURS PRN
Status: DISCONTINUED | OUTPATIENT
Start: 2023-01-20 | End: 2023-01-23 | Stop reason: HOSPADM

## 2023-01-20 RX ORDER — METRONIDAZOLE 500 MG/100ML
500 INJECTION, SOLUTION INTRAVENOUS ONCE
Status: COMPLETED | OUTPATIENT
Start: 2023-01-20 | End: 2023-01-20

## 2023-01-20 RX ORDER — POLYETHYLENE GLYCOL 3350 17 G/17G
17 POWDER, FOR SOLUTION ORAL DAILY PRN
Status: DISCONTINUED | OUTPATIENT
Start: 2023-01-20 | End: 2023-01-23 | Stop reason: HOSPADM

## 2023-01-20 RX ORDER — PANTOPRAZOLE SODIUM 40 MG/1
40 TABLET, DELAYED RELEASE ORAL
Status: CANCELLED | OUTPATIENT
Start: 2023-01-21

## 2023-01-20 RX ORDER — 0.9 % SODIUM CHLORIDE 0.9 %
80 INTRAVENOUS SOLUTION INTRAVENOUS ONCE
Status: DISCONTINUED | OUTPATIENT
Start: 2023-01-20 | End: 2023-01-23 | Stop reason: HOSPADM

## 2023-01-20 RX ORDER — POTASSIUM CHLORIDE 20 MEQ/1
40 TABLET, EXTENDED RELEASE ORAL PRN
Status: DISCONTINUED | OUTPATIENT
Start: 2023-01-20 | End: 2023-01-23 | Stop reason: HOSPADM

## 2023-01-20 RX ORDER — ENOXAPARIN SODIUM 100 MG/ML
40 INJECTION SUBCUTANEOUS DAILY
Status: DISCONTINUED | OUTPATIENT
Start: 2023-01-20 | End: 2023-01-23 | Stop reason: HOSPADM

## 2023-01-20 RX ORDER — ACETAMINOPHEN 650 MG/1
650 SUPPOSITORY RECTAL EVERY 6 HOURS PRN
Status: DISCONTINUED | OUTPATIENT
Start: 2023-01-20 | End: 2023-01-23 | Stop reason: HOSPADM

## 2023-01-20 RX ORDER — 0.9 % SODIUM CHLORIDE 0.9 %
1000 INTRAVENOUS SOLUTION INTRAVENOUS ONCE
Status: COMPLETED | OUTPATIENT
Start: 2023-01-20 | End: 2023-01-20

## 2023-01-20 RX ORDER — ONDANSETRON 4 MG/1
4 TABLET, ORALLY DISINTEGRATING ORAL EVERY 8 HOURS PRN
Status: DISCONTINUED | OUTPATIENT
Start: 2023-01-20 | End: 2023-01-23 | Stop reason: HOSPADM

## 2023-01-20 RX ORDER — SODIUM CHLORIDE 0.9 % (FLUSH) 0.9 %
5-40 SYRINGE (ML) INJECTION EVERY 12 HOURS SCHEDULED
Status: DISCONTINUED | OUTPATIENT
Start: 2023-01-20 | End: 2023-01-23 | Stop reason: HOSPADM

## 2023-01-20 RX ORDER — CIPROFLOXACIN 2 MG/ML
400 INJECTION, SOLUTION INTRAVENOUS ONCE
Status: COMPLETED | OUTPATIENT
Start: 2023-01-20 | End: 2023-01-20

## 2023-01-20 RX ORDER — POTASSIUM CHLORIDE 7.45 MG/ML
10 INJECTION INTRAVENOUS PRN
Status: DISCONTINUED | OUTPATIENT
Start: 2023-01-20 | End: 2023-01-23 | Stop reason: HOSPADM

## 2023-01-20 RX ORDER — MORPHINE SULFATE 4 MG/ML
4 INJECTION, SOLUTION INTRAMUSCULAR; INTRAVENOUS ONCE
Status: COMPLETED | OUTPATIENT
Start: 2023-01-20 | End: 2023-01-20

## 2023-01-20 RX ORDER — SODIUM CHLORIDE 9 MG/ML
INJECTION, SOLUTION INTRAVENOUS CONTINUOUS
Status: DISCONTINUED | OUTPATIENT
Start: 2023-01-20 | End: 2023-01-23 | Stop reason: HOSPADM

## 2023-01-20 RX ORDER — LEVOTHYROXINE AND LIOTHYRONINE 19; 4.5 UG/1; UG/1
60 TABLET ORAL DAILY
Status: DISCONTINUED | OUTPATIENT
Start: 2023-01-20 | End: 2023-01-23 | Stop reason: HOSPADM

## 2023-01-20 RX ORDER — SODIUM CHLORIDE 9 MG/ML
INJECTION, SOLUTION INTRAVENOUS PRN
Status: DISCONTINUED | OUTPATIENT
Start: 2023-01-20 | End: 2023-01-23 | Stop reason: HOSPADM

## 2023-01-20 RX ORDER — ONDANSETRON 2 MG/ML
4 INJECTION INTRAMUSCULAR; INTRAVENOUS EVERY 6 HOURS PRN
Status: DISCONTINUED | OUTPATIENT
Start: 2023-01-20 | End: 2023-01-23 | Stop reason: HOSPADM

## 2023-01-20 RX ADMIN — MORPHINE SULFATE 4 MG: 4 INJECTION, SOLUTION INTRAMUSCULAR; INTRAVENOUS at 12:46

## 2023-01-20 RX ADMIN — MORPHINE SULFATE 4 MG: 4 INJECTION, SOLUTION INTRAMUSCULAR; INTRAVENOUS at 10:12

## 2023-01-20 RX ADMIN — SODIUM CHLORIDE 1000 ML: 9 INJECTION, SOLUTION INTRAVENOUS at 10:11

## 2023-01-20 RX ADMIN — HYDROMORPHONE HYDROCHLORIDE 1 MG: 1 INJECTION, SOLUTION INTRAMUSCULAR; INTRAVENOUS; SUBCUTANEOUS at 22:50

## 2023-01-20 RX ADMIN — CIPROFLOXACIN 400 MG: 2 INJECTION, SOLUTION INTRAVENOUS at 17:33

## 2023-01-20 RX ADMIN — CIPROFLOXACIN 400 MG: 2 INJECTION, SOLUTION INTRAVENOUS at 12:48

## 2023-01-20 RX ADMIN — IOPAMIDOL 75 ML: 755 INJECTION, SOLUTION INTRAVENOUS at 11:34

## 2023-01-20 RX ADMIN — ONDANSETRON 4 MG: 2 INJECTION INTRAMUSCULAR; INTRAVENOUS at 19:47

## 2023-01-20 RX ADMIN — SODIUM CHLORIDE: 9 INJECTION, SOLUTION INTRAVENOUS at 17:31

## 2023-01-20 RX ADMIN — MORPHINE SULFATE 2 MG: 2 INJECTION, SOLUTION INTRAMUSCULAR; INTRAVENOUS at 19:42

## 2023-01-20 RX ADMIN — MORPHINE SULFATE 2 MG: 2 INJECTION, SOLUTION INTRAMUSCULAR; INTRAVENOUS at 16:29

## 2023-01-20 RX ADMIN — Medication 80 ML: at 11:35

## 2023-01-20 RX ADMIN — ONDANSETRON 4 MG: 2 INJECTION INTRAMUSCULAR; INTRAVENOUS at 10:11

## 2023-01-20 RX ADMIN — METRONIDAZOLE 500 MG: 500 INJECTION, SOLUTION INTRAVENOUS at 13:53

## 2023-01-20 RX ADMIN — SODIUM CHLORIDE, PRESERVATIVE FREE 10 ML: 5 INJECTION INTRAVENOUS at 11:35

## 2023-01-20 RX ADMIN — METRONIDAZOLE 500 MG: 500 INJECTION, SOLUTION INTRAVENOUS at 19:44

## 2023-01-20 RX ADMIN — ONDANSETRON 4 MG: 2 INJECTION INTRAMUSCULAR; INTRAVENOUS at 12:45

## 2023-01-20 ASSESSMENT — ENCOUNTER SYMPTOMS
SHORTNESS OF BREATH: 1
DIARRHEA: 0
NAUSEA: 1
DIARRHEA: 1
BLOOD IN STOOL: 1
BACK PAIN: 1
VOMITING: 0
SHORTNESS OF BREATH: 0
COUGH: 0
CONSTIPATION: 0
CHEST TIGHTNESS: 0
ABDOMINAL DISTENTION: 1
ABDOMINAL PAIN: 1

## 2023-01-20 ASSESSMENT — PAIN - FUNCTIONAL ASSESSMENT
PAIN_FUNCTIONAL_ASSESSMENT: ACTIVITIES ARE NOT PREVENTED
PAIN_FUNCTIONAL_ASSESSMENT: 0-10
PAIN_FUNCTIONAL_ASSESSMENT: ACTIVITIES ARE NOT PREVENTED

## 2023-01-20 ASSESSMENT — PAIN SCALES - GENERAL
PAINLEVEL_OUTOF10: 7
PAINLEVEL_OUTOF10: 8
PAINLEVEL_OUTOF10: 8
PAINLEVEL_OUTOF10: 6
PAINLEVEL_OUTOF10: 8
PAINLEVEL_OUTOF10: 9

## 2023-01-20 ASSESSMENT — PAIN DESCRIPTION - ORIENTATION
ORIENTATION: LEFT
ORIENTATION: LEFT;LOWER
ORIENTATION: LEFT;MID

## 2023-01-20 ASSESSMENT — PAIN DESCRIPTION - LOCATION
LOCATION: ABDOMEN
LOCATION: ABDOMEN
LOCATION: ABDOMEN;FLANK

## 2023-01-20 ASSESSMENT — PAIN DESCRIPTION - DESCRIPTORS
DESCRIPTORS: ACHING;SHARP
DESCRIPTORS: ACHING;TENDER

## 2023-01-20 NOTE — ED PROVIDER NOTES
EMERGENCY DEPARTMENT ENCOUNTER   ATTENDING ATTESTATION     Pt Name: Yari Louie  MRN: [id number]  [place] 1975  Date of evaluation: 1/20/23   Yari Louie is a 56 y.o. female with CC: Flank Pain (L, onset last night/) and Abdominal Pain (Generalized abdominal pain; hx of diverticulitis 12/25/22, ATB completed)    MDM:   The patient is a 72-year-old female with a history of diverticulitis who presented to the emergency department secondary to abdominal pain. Orders for CT abdomen pelvis as well as labs placed patient had previous diagnosis of diverticulitis but unable to tolerate the medications. CT scan shows uncomplicated diverticulitis after discussion with GI patient will be admitted for IV antibiotics. This visit was performed by both a physician and an APC. I personally evaluated and examined the patient. I performed all aspects of the MDM as documented. ED Course as of 01/20/23 1350   Fri Jan 20, 2023   1141 Patient being transported to CT via wheelchair. Patient states that her pain and nausea have much improved after pain medication and nausea medication administration. [AJ]   1231 GI consult placed by myself for CT scan that shows diverticulitis on treated by a course of Augmentin and ciprofloxacin. [AJ]   0116 Spoke with Raghu Cummings with GI, will see patient tomorrow in hospital.  Will initiate IV Cipro and Flagyl [AJ]   26 Consult for internal medicine placed [AJ]      ED Course User Index  [AJ] Loraine Burton, KENNETH - CNP       CRITICAL CARE:       EKG: All EKG's are interpreted by the Emergency Department Physician who either signs or Co-signs this chart in the absence of a cardiologist.      RADIOLOGY:All plain film, CT, MRI, and formal ultrasound images (except ED bedside ultrasound) are read by the radiologist, see reports below, unless otherwise noted in MDM or here. No orders to display     LABS: All lab results were reviewed by myself, and all abnormals are listed below.   Labs Reviewed - No data to display  CONSULTS:  None  FINAL IMPRESSION    No diagnosis found.        PASTMEDICAL HISTORY     Past Medical History:   Diagnosis Date    Hypothyroidism      SURGICAL HISTORY       Past Surgical History:   Procedure Laterality Date    BREAST REDUCTION SURGERY       CURRENT MEDICATIONS       Previous Medications    ACETAMINOPHEN (TYLENOL) 500 MG TABLET    Take 1 tablet by mouth 4 times daily as needed for Pain    PANTOPRAZOLE (PROTONIX) 40 MG TABLET    Take 1 tablet by mouth every morning (before breakfast) for 10 days    THYROID (ARMOUR) 60 MG TABLET    Take 60 mg by mouth in the morning.     ALLERGIES     is allergic to latex, trazodone, and trazodone and nefazodone.  FAMILY HISTORY     has no family status information on file.      SOCIAL HISTORY       Social History     Tobacco Use    Smoking status: Light Smoker     Packs/day: 0.25     Types: Cigarettes    Smokeless tobacco: Never   Substance Use Topics    Drug use: Yes     Types: Marijuana (Weed)          Daphney Price MD  The care is provided during an unprecedented national emergency due to the novel coronavirus, COVID 19.  Attending Emergency Physician          Daphney Price MD  01/20/23 3549

## 2023-01-20 NOTE — ED NOTES
ED to inpatient nurses report     Chief Complaint   Patient presents with    Flank Pain     L, onset last night      Abdominal Pain     Generalized abdominal pain; hx of diverticulitis 12/25/22, ATB completed      Present to ED from home  LOC: alert and orientated to name, place, date  Vital signs   Vitals:    01/20/23 0929 01/20/23 1102   BP: (!) 140/88 130/80   Pulse: (!) 106 96   Resp: 16 16   Temp: 98.9 °F (37.2 °C)    TempSrc: Oral    SpO2: 97% 98%   Weight: 185 lb (83.9 kg)    Height: 5' 2\" (1.575 m)       Oxygen Baseline 97%    Current needs required RA   SEPSIS:   [] Lactate X 2 ordered (Yes or No)  [] Antibiotics given (Yes or No)  [] IV Fluids ordered (Yes or No)             [] IV completed (Yes or No)  [] Hourly Vital Signs (Validated)  [] Outstanding Orders:     LDAs:   Peripheral IV 01/20/23 Right Antecubital (Active)   Site Assessment Clean, dry & intact 01/20/23 1010   Line Status Blood return noted 01/20/23 1010   Phlebitis Assessment No symptoms 01/20/23 1010   Infiltration Assessment 0 01/20/23 1010   Dressing Status New dressing applied 01/20/23 1010   Dressing Type Transparent 01/20/23 1010   Dressing Intervention New 01/20/23 1010     Mobility: Independent  Fall Risk:none    Pending ED orders: none  Present condition: stable  Code Status: full  Consults: IP CONSULT TO GI  IP CONSULT TO INTERNAL MEDICINE  []  Hospitalist  Completed  [] yes [] no Who:   [x]  Medicine  Completed  [] yes [] No Who:   []  Cardiology  Completed  [] yes [] No Who:   []  GI   Completed  [] yes [] No Who:   []  Neurology  Completed  [] yes [] No Who:   []  Nephrology Completed  [] yes [] No Who:    []  Vascular  Completed  [] yes [] No Who:   []  Ortho  Completed  [] yes [] No Who:     []  Surgery  Completed  [] yes [] No Who:    []  Urology  Completed  [] yes [] No Who:    []  CT Surgery Completed  [] yes [] No Who:   []  Podiatry  Completed  [] yes [] No Who:    []  Other    Completed  [] yes [] No Who:  Interventions: CT, MS for pain, Zofran for nausea, Cipro IV & Flagyl IV,  Important Events: Pt treated for diverticulitis 12-25-22 with po Cipro but symptoms have not improved. Pt can not take po Flagyl due to \"taste. \"        Electronically signed by Hua Thibodeaux RN on 1/20/2023 at 3:27 PM     Peter Bartlett, [address] [place] [address]  01/20/23 8957

## 2023-01-20 NOTE — ED PROVIDER NOTES
[facility]  eMERGENCY dEPARTMENT eNCOUnter      Pt Name: Deja Alcocer  MRN: [id number]  [place] 1975  Date of evaluation: 1/20/2023  Provider: KENNETH Cueva CNP    CHIEF COMPLAINT       Chief Complaint   Patient presents with    Flank Pain     L, onset last night      Abdominal Pain     Generalized abdominal pain; hx of diverticulitis 12/25/22, ATB completed         HISTORY OF PRESENT ILLNESS  (Location/Symptom, Timing/Onset, Context/Setting, Quality, Duration, Modifying Factors, Severity.)   Deja Alcocer is a 56 y.o. female who presents to the emergency department with complaint of left lower quadrant pain radiating to her left flank. Patient reports history of diverticulosis, states that she recently completed a course of Cipro and Augmentin from her PCP in December for suspected diverticulitis flare. Patient states that her abdominal pain never fully went away in the past few days has been much worse with diarrhea and blood in her stool. Patient reports nausea, no vomiting at this time. Patient denies chest pain, shortness of breath, fever or chills. Nursing Notes were reviewed. ALLERGIES     Latex, Trazodone, and Trazodone and nefazodone    CURRENT MEDICATIONS       Current Discharge Medication List        CONTINUE these medications which have NOT CHANGED    Details   pantoprazole (PROTONIX) 40 MG tablet Take 1 tablet by mouth every morning (before breakfast) for 10 days  Qty: 10 tablet, Refills: 0      acetaminophen (TYLENOL) 500 MG tablet Take 1 tablet by mouth 4 times daily as needed for Pain  Qty: 20 tablet, Refills: 0      thyroid (ARMOUR) 60 MG tablet Take 60 mg by mouth in the morning. PAST MEDICAL HISTORY         Diagnosis Date    Hypothyroidism        SURGICAL HISTORY           Procedure Laterality Date    BREAST REDUCTION SURGERY           FAMILY HISTORY     History reviewed. No pertinent family history. No family status information on file. SOCIAL HISTORY      reports that she has been smoking cigarettes. She has been smoking an average of .25 packs per day. She has never used smokeless tobacco. She reports current drug use. Drug: Marijuana Javier Manuel). REVIEW OF SYSTEMS    (2-9 systems for level 4, 10 or more for level 5)   Review of Systems   Constitutional:  Negative for activity change, appetite change, chills, diaphoresis, fatigue and fever. Respiratory:  Negative for cough, chest tightness and shortness of breath. Cardiovascular:  Negative for chest pain, palpitations and leg swelling. Gastrointestinal:  Positive for abdominal distention, abdominal pain, blood in stool, diarrhea and nausea. Negative for constipation and vomiting. Genitourinary:  Negative for decreased urine volume, difficulty urinating, dyspareunia, dysuria, flank pain, frequency, hematuria, menstrual problem, pelvic pain, urgency, vaginal bleeding, vaginal discharge and vaginal pain. Musculoskeletal:  Negative for myalgias and neck pain. Skin:  Negative for pallor and rash. Neurological:  Negative for dizziness, weakness, light-headedness, numbness and headaches. Psychiatric/Behavioral:  Negative for behavioral problems and sleep disturbance. The patient is not nervous/anxious. Except as noted above the remainder of the review of systems was reviewed and negative. PHYSICAL EXAM    (up to 7 for level 4, 8 or more for level 5)     ED Triage Vitals   BP Temp Temp src Pulse Resp SpO2 Height Weight   -- -- -- -- -- -- -- --     Physical Exam  Vitals and nursing note reviewed. Constitutional:       General: She is not in acute distress. Appearance: Normal appearance. She is normal weight. She is not ill-appearing, toxic-appearing or diaphoretic. HENT:      Head: Normocephalic and atraumatic. Right Ear: External ear normal.      Left Ear: External ear normal.      Nose: Nose normal. No congestion or rhinorrhea.       Mouth/Throat:      Mouth: Mucous membranes are moist.      Pharynx: Oropharynx is clear. Eyes:      General: No scleral icterus. Right eye: No discharge. Left eye: No discharge. Extraocular Movements: Extraocular movements intact. Pupils: Pupils are equal, round, and reactive to light. Cardiovascular:      Rate and Rhythm: Regular rhythm. Tachycardia present. Pulses: Normal pulses. Heart sounds: Normal heart sounds. No murmur heard. Pulmonary:      Effort: Pulmonary effort is normal. No respiratory distress. Breath sounds: Normal breath sounds. No stridor. No wheezing, rhonchi or rales. Chest:      Chest wall: No tenderness. Abdominal:      General: Abdomen is flat. Bowel sounds are normal. There is no distension. Palpations: Abdomen is soft. There is no fluid wave, splenomegaly or mass. Tenderness: There is abdominal tenderness in the left lower quadrant. There is no right CVA tenderness, left CVA tenderness, guarding or rebound. Negative signs include Vences's sign and McBurney's sign. Hernia: No hernia is present. Musculoskeletal:         General: No deformity or signs of injury. Normal range of motion. Cervical back: Normal range of motion and neck supple. Right lower leg: No edema. Left lower leg: No edema. Lymphadenopathy:      Cervical: No cervical adenopathy. Skin:     General: Skin is warm and dry. Capillary Refill: Capillary refill takes less than 2 seconds. Coloration: Skin is not jaundiced or pale. Findings: No rash. Neurological:      General: No focal deficit present. Mental Status: She is alert and oriented to person, place, and time. Cranial Nerves: No cranial nerve deficit. Sensory: No sensory deficit. Motor: No weakness. Gait: Gait normal.   Psychiatric:         Mood and Affect: Mood normal.         Behavior: Behavior normal.         Thought Content:  Thought content normal.           DIAGNOSTIC RESULTS     EKG: All EKG's are interpreted by the Emergency Department Physician who either signs or Co-signs this chart in the absence of a cardiologist.  EKG was interpreted by Dr. Portillo after completion. RADIOLOGY:   Non-plain film images such as CT, Ultrasound and MRI are read by the radiologist. Plain radiographic images are visualized and preliminarily interpreted by the emergency physician with the below findings:    Interpretation per the Radiologist below, if available at the time of this note:    CT ABDOMEN PELVIS W IV CONTRAST Additional Contrast? None    Result Date: 1/20/2023  EXAMINATION: CT OF THE ABDOMEN AND PELVIS WITH CONTRAST 1/20/2023 11:39 am TECHNIQUE: CT of the abdomen and pelvis was performed with the administration of intravenous contrast. Multiplanar reformatted images are provided for review. Automated exposure control, iterative reconstruction, and/or weight based adjustment of the mA/kV was utilized to reduce the radiation dose to as low as reasonably achievable. COMPARISON: 07/26/2022 HISTORY: ORDERING SYSTEM PROVIDED HISTORY: abdominal pain TECHNOLOGIST PROVIDED HISTORY: abdominal pain Decision Support Exception - unselect if not a suspected or confirmed emergency medical condition->Emergency Medical Condition (MA) Reason for Exam: abdominal pain FINDINGS: Lower Chest: No acute findings. Organs: Question mild Paddock steatosis. The gallbladder, pancreas, spleen, adrenals and kidneys reveal no acute findings. GI/Bowel: Interval resolution of diverticulitis in the distal descending/proximal sigmoid segment. Interval development of mild acute diverticulitis in the proximal descending segment. No evidence for gross perforation, abscess or obstruction. Normal appendix. Pelvis: No acute findings. Peritoneum/Retroperitoneum: No free air or free fluid. The aorta is normal in caliber. The visceral branches are patent. Few subcentimeter retroperitoneal lymph nodes appears stable. No new or enlarging lymph nodes. . Bones/Soft Tissues: No abnormality identified. *Unless otherwise specified, incidental findings do not require dedicated imaging follow-up. 1.  Acute uncomplicated diverticulitis in the proximal descending segment. 2.  Interval resolution of previously seen distal descending/proximal sigmoid diverticulitis. LABS:  Labs Reviewed   CBC WITH AUTO DIFFERENTIAL - Abnormal; Notable for the following components:       Result Value    WBC 11.7 (*)     RBC 5.25 (*)     Hemoglobin 15.5 (*)     RDW 14.6 (*)     All other components within normal limits   COMPREHENSIVE METABOLIC PANEL - Abnormal; Notable for the following components:    Bun/Cre Ratio 8 (*)     Chloride 109 (*)     All other components within normal limits   URINALYSIS WITH MICROSCOPIC - Abnormal; Notable for the following components:    Turbidity UA SLIGHTLY CLOUDY (*)     Urine Hgb TRACE (*)     Bacteria, UA FEW (*)     All other components within normal limits   COMPREHENSIVE METABOLIC PANEL W/ REFLEX TO MG FOR LOW K - Abnormal; Notable for the following components:    Glucose 111 (*)     BUN 5 (*)     Bun/Cre Ratio 7 (*)     Potassium 3.3 (*)     Total Protein 6.3 (*)     All other components within normal limits   PHOSPHORUS - Abnormal; Notable for the following components:    Phosphorus 2.5 (*)     All other components within normal limits   CBC WITH AUTO DIFFERENTIAL - Abnormal; Notable for the following components:    Seg Neutrophils 67 (*)     All other components within normal limits   GASTROINTESTINAL PANEL, MOLECULAR   C DIFF TOXIN/ANTIGEN   FECAL LEUKOCYTES   LIPASE   HCG, SERUM, QUALITATIVE   MAGNESIUM   LIPASE   BLOOD OCCULT STOOL SCREEN #1       All other labs were within normal range or not returned as of this dictation.     EMERGENCY DEPARTMENT COURSE and DIFFERENTIAL DIAGNOSIS/MDM:   Vitals:    Vitals:    01/20/23 2250 01/21/23 0306 01/21/23 0449 01/21/23 0727   BP:    120/73   Pulse:    71   Resp: 16 16  16   Temp:    98.5 °F (36.9 °C)   TempSrc:    Oral   SpO2:    97%   Weight:   188 lb (85.3 kg)    Height:           MEDICATIONS GIVEN IN THE ED:  Medications   sodium chloride flush 0.9 % injection 10 mL (10 mLs IntraVENous Given 1/20/23 1135)   0.9 % sodium chloride bolus (80 mLs IntraVENous Bolus from Bag 1/20/23 1135)   thyroid (ARMOUR) tablet 60 mg (60 mg Oral Given 1/21/23 0902)   sodium chloride flush 0.9 % injection 5-40 mL (5 mLs IntraVENous Not Given 1/21/23 1144)   sodium chloride flush 0.9 % injection 10 mL (has no administration in time range)   0.9 % sodium chloride infusion (has no administration in time range)   potassium chloride (KLOR-CON M) extended release tablet 40 mEq ( Oral See Alternative 1/21/23 0903)     Or   potassium bicarb-citric acid (EFFER-K) effervescent tablet 40 mEq (40 mEq Oral Given 1/21/23 0903)     Or   potassium chloride 10 mEq/100 mL IVPB (Peripheral Line) ( IntraVENous See Alternative 1/21/23 0903)   magnesium sulfate 1000 mg in dextrose 5% 100 mL IVPB (has no administration in time range)   enoxaparin (LOVENOX) injection 40 mg (40 mg SubCUTAneous Not Given 1/21/23 1144)   ondansetron (ZOFRAN-ODT) disintegrating tablet 4 mg ( Oral See Alternative 1/21/23 0858)     Or   ondansetron (ZOFRAN) injection 4 mg (4 mg IntraVENous Given 1/21/23 0858)   acetaminophen (TYLENOL) tablet 650 mg (has no administration in time range)     Or   acetaminophen (TYLENOL) suppository 650 mg (has no administration in time range)   polyethylene glycol (GLYCOLAX) packet 17 g (has no administration in time range)   0.9 % sodium chloride infusion ( IntraVENous New Bag 1/21/23 0545)   metronidazole (FLAGYL) 500 mg in 0.9% NaCl 100 mL IVPB premix (0 mg IntraVENous Stopped 1/21/23 1018)   ciprofloxacin (CIPRO) IVPB 400 mg (0 mg IntraVENous Stopped 1/21/23 0648)   HYDROmorphone HCl PF (DILAUDID) injection 1 mg (1 mg IntraVENous Given 1/21/23 0905)   0.9 % sodium chloride bolus (0 mLs IntraVENous Stopped 1/20/23 1212)   ondansetron (ZOFRAN) injection 4 mg (4 mg IntraVENous Given 1/20/23 1011)   morphine sulfate (PF) injection 4 mg (4 mg IntraVENous Given 1/20/23 1012)   iopamidol (ISOVUE-370) 76 % injection 75 mL (75 mLs IntraVENous Given 1/20/23 1134)   morphine sulfate (PF) injection 4 mg (4 mg IntraVENous Given 1/20/23 1246)   ondansetron (ZOFRAN) injection 4 mg (4 mg IntraVENous Given 1/20/23 1245)   metronidazole (FLAGYL) 500 mg in 0.9% NaCl 100 mL IVPB premix (0 mg IntraVENous Stopped 1/20/23 1453)   ciprofloxacin (CIPRO) IVPB 400 mg (0 mg IntraVENous Stopped 1/20/23 1353)       CLINICAL DECISION MAKING:  The patient presented alert with a nontoxic appearance and was seen in conjunction with Dr. Dagoberto Salguero. Patient presents ED with above complaint. She has completed a course of ciprofloxacin and Augmentin that was prescribed to her by her primary care doctor in December. DDx include diverticulitis, bowel perforation, nephrolithiasis    For this patient I ordered for CBC, CMP, lipase, serum hCG, urinalysis. CT abdomen pelvis with contrast.  I ordered Zofran and morphine for this patient for pain and nausea, saline bolus 1 L IV. The patient was involved in his/her plan of care. The tests that were ordered were discussed with the patient. Any medications that may have been ordered were discussed with the patient. I have reviewed the patient's previous medical records. Labs and imaging were reviewed. I have consulted Jordon Olmstead NP with internal medicine. The patient will be admitted to [facility], he/she agrees to accept the patient for further evaluation and treatment. I have discusssed recommendation for admission with the patient and family. We have discussed benefits of admission and the risks of discharge home. The patient agrees with the plan of care and admission. The patient will be admitted for further evaluation and treatment.      Care was provided during an unprecedented national emergency due to the novel coronavirus, Covid-19. Code status: FULL    CONSULTS:  IP CONSULT TO GI  IP CONSULT TO INTERNAL MEDICINE  IP CONSULT TO GI      FINAL IMPRESSION      1. Diverticulitis of colon            Problem List  Patient Active Problem List   Diagnosis Code    Hypothyroidism E03.9    Diverticulosis K57.90    Hypokalemia E87.6    Diverticulitis of colon K57.32    LLQ abdominal pain R10.32    Loose stools R19.5    Acute diverticulitis K57.92         DISPOSITION/PLAN   DISPOSITION Admitted 01/20/2023 01:11:27 PM      PATIENT REFERRED TO:   No follow-up provider specified.     DISCHARGE MEDICATIONS:     Current Discharge Medication List              (Please note that portions of this note were completed with a voice recognition program.  Efforts were made to edit the dictations but occasionally words are mis-transcribed.)    KENNETH Atkins CNP, APRN - CNP  01/21/23 8260

## 2023-01-20 NOTE — H&P
[facility]  Office: [address], DO, Elkin Zohra, DO, Carleymaggie Dotsono, DO, Tarik Clemens Blood, DO, Cordella Holstein, MD, Evgeny Wan MD, Ba Hua MD, Franco Dawkins MD,  Gabe Padilla MD, Rachel Llamas MD, Tej Browning, DO, Kayli Cardenas MD,  Efrem Dolan MD, Kelly Arcos MD, Holger Holland, DO, Andre Figueroa MD, Yazan Blackburn MD, Omayra Shook, DO, Cinthya Oh MD, Mulu Love MD, Haresh Gómez MD, Meera Mendoza MD, Sharee Figueroa, DO, Benito Majano MD, Jesus Mcmullen MD, Maddie Magdaleno, CNP,  Izzy Mora, CNP, Kamala Mckay, [facility], Isidro Ayon, CNP,  Wilfredo Messina, [facility], Manjinder Hoskins, CNP, Carlos Lares, CNP, Patrick Oreilly, CNP, Hunter Rodriguez, CNP, Siomara Lu, CNP, Kiera Gamma, PA-C, Rickey Mena, CNS, Pavan Michaels, CNP, Honey Ramirez, [facility]    HISTORY AND PHYSICAL EXAMINATION            Date:   1/20/2023  Patient name:  Terese Peng  Date of admission:  1/20/2023  9:32 AM  MRN:   [id number]  Account:  [de-identified]  YOB: 1975  PCP:    KENNETH Sena CNP  Room:   [place] [address]  Code Status:    Full    Chief Complaint:     Chief Complaint   Patient presents with    Flank Pain     L, onset last night      Abdominal Pain     Generalized abdominal pain; hx of diverticulitis 12/25/22, ATB completed     History Obtained From:     patient, electronic medical record    History of Present Illness:     Patient presents to the emergency room today with complaints of left lower quadrant abdominal pain that radiates to her back. Patient has a significant past medical history of hypothyroidism and diverticulitis. Patient states that back in December she started to experience left lower quadrant abdominal pain and was treated by her PCP with Cipro and Augmentin however, her pain never fully went away.   Yesterday her abdominal pain significantly intensified. Patient was experiencing chills, nausea, diarrhea and did notice blood in her stools yesterday. Patient denies ever having a colonoscopy or following up with GI. Patient states that she has noted her stools to be flat and feels as if at times it is difficult to go. Patient denies any recent fevers, chest pain and vomiting. Patient was following the BRAT diet without symptom relief. Throughout the emergency room evaluation it was noted that her chloride is 109. WBC 11.7. Hemoglobin 15.5. CT abdomen and pelvis shows:   1. Acute uncomplicated diverticulitis in the proximal descending segment. 2.  Interval resolution of previously seen distal descending/proximal sigmoid   diverticulitis. Past Medical History:     Past Medical History:   Diagnosis Date    Hypothyroidism         Past Surgical History:     Past Surgical History:   Procedure Laterality Date    BREAST REDUCTION SURGERY          Medications Prior to Admission:     Prior to Admission medications    Medication Sig Start Date End Date Taking? Authorizing Provider   pantoprazole (PROTONIX) 40 MG tablet Take 1 tablet by mouth every morning (before breakfast) for 10 days 7/30/22 8/9/22  Rabia Luu MD   acetaminophen (TYLENOL) 500 MG tablet Take 1 tablet by mouth 4 times daily as needed for Pain 7/29/22 8/3/22  Rabia Luu MD   thyroid (ARMOUR) 60 MG tablet Take 60 mg by mouth in the morning. Historical Provider, MD        Allergies:     Latex, Trazodone, and Trazodone and nefazodone    Social History:     Tobacco:    reports that she has been smoking cigarettes. She has been smoking an average of .25 packs per day. She has never used smokeless tobacco.  Alcohol:      has no history on file for alcohol use. Drug Use:  reports current drug use. Drug: Marijuana Delona Members). Family History:     History reviewed. No pertinent family history. Review of Systems:     Positive and Negative as described in HPI.     Review of Systems   Constitutional:  Positive for chills and fatigue. Negative for activity change and fever. HENT:  Negative for congestion. Respiratory:  Positive for shortness of breath (with sharp pain). Negative for cough and chest tightness. Cardiovascular:  Positive for palpitations. Negative for chest pain and leg swelling. Gastrointestinal:  Positive for abdominal pain, blood in stool and nausea. Negative for constipation, diarrhea and vomiting. Endocrine: Negative for cold intolerance and polyuria. Genitourinary:  Negative for difficulty urinating. Musculoskeletal:  Positive for back pain. Negative for myalgias. Skin:  Negative for wound. Neurological:  Negative for dizziness and numbness. Psychiatric/Behavioral:  Negative for confusion. Physical Exam:   /80   Pulse 96   Temp 98.9 °F (37.2 °C) (Oral)   Resp 16   Ht 5' 2\" (1.575 m)   Wt 185 lb (83.9 kg)   LMP 2022 (Exact Date)   SpO2 98%   BMI 33.84 kg/m²   Temp (24hrs), Av.9 °F (37.2 °C), Min:98.9 °F (37.2 °C), Max:98.9 °F (37.2 °C)    No results for input(s): POCGLU in the last 72 hours. No intake or output data in the 24 hours ending 23 1453    Physical Exam  Vitals and nursing note reviewed. Constitutional:       General: She is not in acute distress. Appearance: Normal appearance. She is not ill-appearing. HENT:      Head: Normocephalic. Mouth/Throat:      Mouth: Mucous membranes are moist.   Eyes:      Pupils: Pupils are equal, round, and reactive to light. Cardiovascular:      Rate and Rhythm: Normal rate and regular rhythm. Pulses: Normal pulses. Heart sounds: Normal heart sounds. No murmur heard. No friction rub. No gallop. Pulmonary:      Effort: Pulmonary effort is normal. No respiratory distress. Breath sounds: Normal breath sounds. No wheezing, rhonchi or rales. Abdominal:      General: Bowel sounds are decreased. There is no distension.       Palpations: Abdomen is soft. Tenderness: There is abdominal tenderness in the left lower quadrant. There is left CVA tenderness. There is no guarding. Musculoskeletal:         General: No swelling. Normal range of motion. Cervical back: Normal range of motion. Skin:     General: Skin is warm and dry. Capillary Refill: Capillary refill takes less than 2 seconds. Coloration: Skin is not pale. Neurological:      General: No focal deficit present. Mental Status: She is alert and oriented to person, place, and time. Motor: No weakness. Psychiatric:         Mood and Affect: Mood normal.         Behavior: Behavior normal.         Thought Content:  Thought content normal.         Judgment: Judgment normal.       Investigations:      Laboratory Testing:  Recent Results (from the past 24 hour(s))   Urinalysis with Microscopic    Collection Time: 01/20/23  9:40 AM   Result Value Ref Range    Color, UA Yellow Yellow    Turbidity UA SLIGHTLY CLOUDY (A) Clear    Glucose, Ur NEGATIVE NEGATIVE    Bilirubin Urine NEGATIVE NEGATIVE    Ketones, Urine NEGATIVE NEGATIVE    Specific Gravity, UA 1.008 1.005 - 1.030    Urine Hgb TRACE (A) NEGATIVE    pH, UA 6.0 5.0 - 8.0    Protein, UA NEGATIVE NEGATIVE    Urobilinogen, Urine Normal Normal    Nitrite, Urine NEGATIVE NEGATIVE    Leukocyte Esterase, Urine NEGATIVE NEGATIVE    WBC, UA 2 TO 5 0 - 5 /HPF    RBC, UA 0 TO 2 0 - 2 /HPF    Epithelial Cells UA 10 TO 20 0 - 5 /HPF    Bacteria, UA FEW (A) None   CBC with Auto Differential    Collection Time: 01/20/23 10:00 AM   Result Value Ref Range    WBC 11.7 (H) 3.5 - 11.3 k/uL    RBC 5.25 (H) 3.95 - 5.11 m/uL    Hemoglobin 15.5 (H) 11.9 - 15.1 g/dL    Hematocrit 45.9 36.3 - 47.1 %    MCV 87.4 82.6 - 102.9 fL    MCH 29.5 25.2 - 33.5 pg    MCHC 33.8 28.4 - 34.8 g/dL    RDW 14.6 (H) 11.8 - 14.4 %    Platelets 155 259 - 135 k/uL    MPV 12.4 8.1 - 13.5 fL    NRBC Automated 0.0 0.0 per 100 WBC    Seg Neutrophils 65 36 - 65 % Lymphocytes 27 24 - 43 %    Monocytes 6 3 - 12 %    Eosinophils % 1 1 - 4 %    Basophils 1 0 - 2 %    Immature Granulocytes 0 0 %    Segs Absolute 7.63 1.50 - 8.10 k/uL    Absolute Lymph # 3.19 1.10 - 3.70 k/uL    Absolute Mono # 0.71 0.10 - 1.20 k/uL    Absolute Eos # 0.06 0.00 - 0.44 k/uL    Basophils Absolute 0.06 0.00 - 0.20 k/uL    Absolute Immature Granulocyte 0.03 0.00 - 0.30 k/uL    RBC Morphology ANISOCYTOSIS PRESENT    CMP    Collection Time: 01/20/23 10:00 AM   Result Value Ref Range    Glucose 89 70 - 99 mg/dL    BUN 6 6 - 20 mg/dL    Creatinine 0.72 0.50 - 0.90 mg/dL    Est, Glom Filt Rate >60 >60 mL/min/1.73m2    Bun/Cre Ratio 8 (L) 9 - 20    Calcium 9.2 8.6 - 10.4 mg/dL    Sodium 142 135 - 144 mmol/L    Potassium 3.7 3.7 - 5.3 mmol/L    Chloride 109 (H) 98 - 107 mmol/L    CO2 22 20 - 31 mmol/L    Anion Gap 11 9 - 17 mmol/L    Alkaline Phosphatase 71 35 - 104 U/L    ALT 20 5 - 33 U/L    AST 23 <32 U/L    Total Bilirubin 0.9 0.3 - 1.2 mg/dL    Total Protein 7.1 6.4 - 8.3 g/dL    Albumin 4.2 3.5 - 5.2 g/dL   Lipase    Collection Time: 01/20/23 10:00 AM   Result Value Ref Range    Lipase 20 13 - 60 U/L   HCG Qualitative, Serum    Collection Time: 01/20/23 10:00 AM   Result Value Ref Range    hCG Qual NEGATIVE NEGATIVE       Imaging/Diagnostics:  CT ABDOMEN PELVIS W IV CONTRAST Additional Contrast? None    Result Date: 1/20/2023  1. Acute uncomplicated diverticulitis in the proximal descending segment. 2.  Interval resolution of previously seen distal descending/proximal sigmoid diverticulitis.        Assessment :      Hospital Problems             Last Modified POA    * (Principal) Acute diverticulitis 1/20/2023 Yes    Diverticulosis 1/20/2023 Yes    LLQ abdominal pain 1/20/2023 Yes    Hypothyroidism 1/20/2023 Yes       Plan:     Patient status inpatient in the  Med/Surge    Acute diverticulitis with left lower quadrant abdominal pain  GI consult  IV hydration  IV antibiotics  IV antiemetics  Monitor stools  Hypothyroidism  Continue home medication  Full liquid diet  Monitor AM labs    Consultations:   IP CONSULT TO GI  IP CONSULT TO INTERNAL MEDICINE    Patient is admitted as inpatient status because of co-morbidities listed above, severity of signs and symptoms as outlined, requirement for current medical therapies and most importantly because of direct risk to patient if care not provided in a hospital setting. Expected length of stay > 48 hours. On this date 1/20/2023 I have spent 26 minutes reviewing previous notes, test results and face to face with the patient discussing the diagnosis and importance of compliance with the treatment plan as well as documenting on the day of the visit. At least 50% of the time documented was spent with the patient to provide counseling and/or coordination of care.       KENNETH Lepe CNP  1/20/2023  2:53 PM    Copy sent to KENNETH Fenton - CNP

## 2023-01-20 NOTE — CARE COORDINATION
Case Management Assessment  Initial Evaluation    Date/Time of Evaluation: 1/20/2023 3:25 PM  Assessment Completed by: PERRY Rogel    If patient is discharged prior to next notation, then this note serves as note for discharge by case management. Patient Name: Rajani Bauer                   YOB: 1975  Diagnosis: Acute diverticulitis [K57.92]                   Date / Time: 1/20/2023  9:32 AM    Patient Admission Status: Inpatient   Readmission Risk (Low < 19, Mod (19-27), High > 27): Readmission Risk Score: 6.4    Current PCP: KENNETH José CNP  PCP verified by CM? Yes    Chart Reviewed: Yes      History Provided by: Patient  Patient Orientation: Alert and Oriented    Patient Cognition: Alert    Hospitalization in the last 30 days (Readmission):  No    If yes, Readmission Assessment in CM Navigator will be completed. Advance Directives:      Code Status: Prior   Patient's Primary Decision Maker is: Legal Next of Kin      Discharge Planning:    Patient lives with: Spouse/Significant Other Type of Home: House  Primary Care Giver: Self  Patient Support Systems include: Spouse/Significant Other   Current Financial resources:    Current community resources:    Current services prior to admission: None            Current DME:              Type of Home Care services:  None    ADLS  Prior functional level: Independent in ADLs/IADLs  Current functional level: Independent in ADLs/IADLs    PT AM-PAC:   /24  OT AM-PAC:   /24    Family can provide assistance at DC: Yes  Would you like Case Management to discuss the discharge plan with any other family members/significant others, and if so, who?  No  Plans to Return to Present Housing: Yes  Other Identified Issues/Barriers to RETURNING to current housing: none  Potential Assistance needed at discharge: N/A            Potential DME:    Patient expects to discharge to: [address] [place] [address] for transportation at discharge:      Financial    Payor: Andrés Munson [employer]IG[employer]ARK / Plan: Donita Lu PPO [place] LOCAL / Product Type: *No Product type* /     Does insurance require precert for SNF: Yes    Potential assistance Purchasing Medications: No  Meds-to-Beds request:        [facility] #[id number] - [address] [facility]  [address]  Phone: [phone] Fax: [phone]    [facility] #[id number] - [address] [facility] [address] - P [phone] - F [phone]  [address]  [address] [facility] [address]  Phone: [phone] Fax: [phone]      Notes:    Factors facilitating achievement of predicted outcomes: Family support    Barriers to discharge: Pain    Additional Case Management Notes: Patient lives with  and is independent at home. Reports that she does not utilize DME. Anticipates that she will not need HHC or SNF at discharge. Plan is home with spouse. The Plan for Transition of Care is related to the following treatment goals of Acute diverticulitis [V99.68]    IF APPLICABLE: The Patient and/or patient representative [id number] and her family were provided with a choice of provider and agrees with the discharge plan. Freedom of choice list with basic dialogue that supports the patient's individualized plan of care/goals and shares the quality data associated with the providers was provided to:     Patient Representative Name:       The Patient and/or Patient Representative Agree with the Discharge Plan?       [facility]  Case Management Department  Ph: [phone] Fax: [phone]

## 2023-01-20 NOTE — PLAN OF CARE
Problem: Discharge Planning  Goal: Discharge to home or other facility with appropriate resources  Outcome: Progressing  Flowsheets (Taken 1/20/2023 1708)  Discharge to home or other facility with appropriate resources: Identify barriers to discharge with patient and caregiver     Problem: Pain  Goal: Verbalizes/displays adequate comfort level or baseline comfort level  Outcome: Progressing     Problem: Gastrointestinal - Adult  Goal: Minimal or absence of nausea and vomiting  Outcome: Progressing  Flowsheets (Taken 1/20/2023 1708)  Minimal or absence of nausea and vomiting: Administer IV fluids as ordered to ensure adequate hydration     Problem: Infection - Adult  Goal: Absence of infection at discharge  Outcome: Progressing  Flowsheets (Taken 1/20/2023 1708)  Absence of infection at discharge: Assess and monitor for signs and symptoms of infection

## 2023-01-21 LAB
ABSOLUTE EOS #: 0.05 K/UL (ref 0–0.44)
ABSOLUTE IMMATURE GRANULOCYTE: 0.03 K/UL (ref 0–0.3)
ABSOLUTE LYMPH #: 2.38 K/UL (ref 1.1–3.7)
ABSOLUTE MONO #: 0.6 K/UL (ref 0.1–1.2)
ALBUMIN SERPL-MCNC: 3.5 G/DL (ref 3.5–5.2)
ALP BLD-CCNC: 61 U/L (ref 35–104)
ALT SERPL-CCNC: 15 U/L (ref 5–33)
ANION GAP SERPL CALCULATED.3IONS-SCNC: 10 MMOL/L (ref 9–17)
AST SERPL-CCNC: 16 U/L
BASOPHILS # BLD: 1 % (ref 0–2)
BASOPHILS ABSOLUTE: 0.05 K/UL (ref 0–0.2)
BILIRUB SERPL-MCNC: 1 MG/DL (ref 0.3–1.2)
BUN BLDV-MCNC: 5 MG/DL (ref 6–20)
BUN/CREAT BLD: 7 (ref 9–20)
CALCIUM SERPL-MCNC: 8.9 MG/DL (ref 8.6–10.4)
CHLORIDE BLD-SCNC: 103 MMOL/L (ref 98–107)
CO2: 25 MMOL/L (ref 20–31)
CREAT SERPL-MCNC: 0.76 MG/DL (ref 0.5–0.9)
DATE, STOOL #1: NORMAL
EOSINOPHILS RELATIVE PERCENT: 1 % (ref 1–4)
GFR SERPL CREATININE-BSD FRML MDRD: >60 ML/MIN/1.73M2
GLUCOSE BLD-MCNC: 111 MG/DL (ref 70–99)
HCT VFR BLD CALC: 40.4 % (ref 36.3–47.1)
HEMOCCULT SP1 STL QL: NEGATIVE
HEMOGLOBIN: 12.6 G/DL (ref 11.9–15.1)
IMMATURE GRANULOCYTES: 0 %
LACTOFERRIN, QUAL: ABNORMAL
LIPASE: 49 U/L (ref 13–60)
LYMPHOCYTES # BLD: 26 % (ref 24–43)
MAGNESIUM: 1.8 MG/DL (ref 1.6–2.6)
MCH RBC QN AUTO: 28.4 PG (ref 25.2–33.5)
MCHC RBC AUTO-ENTMCNC: 31.2 G/DL (ref 28.4–34.8)
MCV RBC AUTO: 91.2 FL (ref 82.6–102.9)
MONOCYTES # BLD: 7 % (ref 3–12)
NRBC AUTOMATED: 0 PER 100 WBC
PDW BLD-RTO: 13.7 % (ref 11.8–14.4)
PHOSPHORUS: 2.5 MG/DL (ref 2.6–4.5)
PLATELET # BLD: 208 K/UL (ref 138–453)
PMV BLD AUTO: 9.4 FL (ref 8.1–13.5)
POTASSIUM SERPL-SCNC: 3.3 MMOL/L (ref 3.7–5.3)
RBC # BLD: 4.43 M/UL (ref 3.95–5.11)
SEG NEUTROPHILS: 67 % (ref 36–65)
SEGMENTED NEUTROPHILS ABSOLUTE COUNT: 6.15 K/UL (ref 1.5–8.1)
SODIUM BLD-SCNC: 138 MMOL/L (ref 135–144)
TIME, STOOL #1: 1248
TOTAL PROTEIN: 6.3 G/DL (ref 6.4–8.3)
WBC # BLD: 9.3 K/UL (ref 3.5–11.3)

## 2023-01-21 PROCEDURE — 6370000000 HC RX 637 (ALT 250 FOR IP): Performed by: NURSE PRACTITIONER

## 2023-01-21 PROCEDURE — 99231 SBSQ HOSP IP/OBS SF/LOW 25: CPT | Performed by: INTERNAL MEDICINE

## 2023-01-21 PROCEDURE — 6360000002 HC RX W HCPCS: Performed by: NURSE PRACTITIONER

## 2023-01-21 PROCEDURE — 84100 ASSAY OF PHOSPHORUS: CPT

## 2023-01-21 PROCEDURE — 83735 ASSAY OF MAGNESIUM: CPT

## 2023-01-21 PROCEDURE — 2580000003 HC RX 258: Performed by: EMERGENCY MEDICINE

## 2023-01-21 PROCEDURE — 1200000000 HC SEMI PRIVATE

## 2023-01-21 PROCEDURE — 2500000003 HC RX 250 WO HCPCS: Performed by: NURSE PRACTITIONER

## 2023-01-21 PROCEDURE — 85025 COMPLETE CBC W/AUTO DIFF WBC: CPT

## 2023-01-21 PROCEDURE — 36415 COLL VENOUS BLD VENIPUNCTURE: CPT

## 2023-01-21 PROCEDURE — 99222 1ST HOSP IP/OBS MODERATE 55: CPT | Performed by: INTERNAL MEDICINE

## 2023-01-21 PROCEDURE — 80053 COMPREHEN METABOLIC PANEL: CPT

## 2023-01-21 PROCEDURE — 2580000003 HC RX 258: Performed by: NURSE PRACTITIONER

## 2023-01-21 PROCEDURE — 83690 ASSAY OF LIPASE: CPT

## 2023-01-21 RX ADMIN — METRONIDAZOLE 500 MG: 500 INJECTION, SOLUTION INTRAVENOUS at 03:08

## 2023-01-21 RX ADMIN — HYDROMORPHONE HYDROCHLORIDE 1 MG: 1 INJECTION, SOLUTION INTRAMUSCULAR; INTRAVENOUS; SUBCUTANEOUS at 03:06

## 2023-01-21 RX ADMIN — ONDANSETRON 4 MG: 2 INJECTION INTRAMUSCULAR; INTRAVENOUS at 08:58

## 2023-01-21 RX ADMIN — LEVOTHYROXINE, LIOTHYRONINE 60 MG: 19; 4.5 TABLET ORAL at 09:02

## 2023-01-21 RX ADMIN — ONDANSETRON 4 MG: 2 INJECTION INTRAMUSCULAR; INTRAVENOUS at 03:06

## 2023-01-21 RX ADMIN — POTASSIUM BICARBONATE 40 MEQ: 782 TABLET, EFFERVESCENT ORAL at 09:03

## 2023-01-21 RX ADMIN — CIPROFLOXACIN 400 MG: 2 INJECTION, SOLUTION INTRAVENOUS at 16:33

## 2023-01-21 RX ADMIN — HYDROMORPHONE HYDROCHLORIDE 1 MG: 1 INJECTION, SOLUTION INTRAMUSCULAR; INTRAVENOUS; SUBCUTANEOUS at 23:05

## 2023-01-21 RX ADMIN — SODIUM CHLORIDE: 9 INJECTION, SOLUTION INTRAVENOUS at 17:53

## 2023-01-21 RX ADMIN — ONDANSETRON 4 MG: 2 INJECTION INTRAMUSCULAR; INTRAVENOUS at 17:48

## 2023-01-21 RX ADMIN — METRONIDAZOLE 500 MG: 500 INJECTION, SOLUTION INTRAVENOUS at 09:18

## 2023-01-21 RX ADMIN — SODIUM CHLORIDE: 9 INJECTION, SOLUTION INTRAVENOUS at 05:45

## 2023-01-21 RX ADMIN — SODIUM CHLORIDE, PRESERVATIVE FREE 10 ML: 5 INJECTION INTRAVENOUS at 17:56

## 2023-01-21 RX ADMIN — SODIUM CHLORIDE, PRESERVATIVE FREE 10 ML: 5 INJECTION INTRAVENOUS at 17:48

## 2023-01-21 RX ADMIN — HYDROMORPHONE HYDROCHLORIDE 1 MG: 1 INJECTION, SOLUTION INTRAMUSCULAR; INTRAVENOUS; SUBCUTANEOUS at 09:05

## 2023-01-21 RX ADMIN — CIPROFLOXACIN 400 MG: 2 INJECTION, SOLUTION INTRAVENOUS at 05:48

## 2023-01-21 RX ADMIN — HYDROMORPHONE HYDROCHLORIDE 1 MG: 1 INJECTION, SOLUTION INTRAMUSCULAR; INTRAVENOUS; SUBCUTANEOUS at 17:55

## 2023-01-21 RX ADMIN — HYDROMORPHONE HYDROCHLORIDE 1 MG: 1 INJECTION, SOLUTION INTRAMUSCULAR; INTRAVENOUS; SUBCUTANEOUS at 13:43

## 2023-01-21 RX ADMIN — ONDANSETRON 4 MG: 2 INJECTION INTRAMUSCULAR; INTRAVENOUS at 23:58

## 2023-01-21 RX ADMIN — METRONIDAZOLE 500 MG: 500 INJECTION, SOLUTION INTRAVENOUS at 18:00

## 2023-01-21 ASSESSMENT — PAIN SCALES - GENERAL
PAINLEVEL_OUTOF10: 8
PAINLEVEL_OUTOF10: 7
PAINLEVEL_OUTOF10: 7
PAINLEVEL_OUTOF10: 8
PAINLEVEL_OUTOF10: 8
PAINLEVEL_OUTOF10: 7
PAINLEVEL_OUTOF10: 8

## 2023-01-21 ASSESSMENT — PAIN - FUNCTIONAL ASSESSMENT
PAIN_FUNCTIONAL_ASSESSMENT: PREVENTS OR INTERFERES SOME ACTIVE ACTIVITIES AND ADLS
PAIN_FUNCTIONAL_ASSESSMENT: ACTIVITIES ARE NOT PREVENTED
PAIN_FUNCTIONAL_ASSESSMENT: PREVENTS OR INTERFERES SOME ACTIVE ACTIVITIES AND ADLS

## 2023-01-21 ASSESSMENT — PAIN DESCRIPTION - LOCATION
LOCATION: ABDOMEN

## 2023-01-21 ASSESSMENT — ENCOUNTER SYMPTOMS
NAUSEA: 1
ABDOMINAL PAIN: 1
VOMITING: 1

## 2023-01-21 ASSESSMENT — PAIN DESCRIPTION - DESCRIPTORS
DESCRIPTORS: ACHING;SHARP
DESCRIPTORS: ACHING;SORE;SHARP
DESCRIPTORS: ACHING;DISCOMFORT;SORE
DESCRIPTORS: ACHING;SHARP
DESCRIPTORS: ACHING;SHARP

## 2023-01-21 ASSESSMENT — PAIN DESCRIPTION - ORIENTATION
ORIENTATION: LEFT
ORIENTATION: RIGHT
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
## 2023-01-21 NOTE — PLAN OF CARE
Problem: Gastrointestinal - Adult  Goal: Minimal or absence of nausea and vomiting  1/21/2023 1140 by Shira Nguyen RN  Outcome: Not Progressing  1/20/2023 2223 by [place] Marinelli RN  Outcome: Progressing  Flowsheets (Taken 1/20/2023 1929)  Minimal or absence of nausea and vomiting: Administer IV fluids as ordered to ensure adequate hydration     Problem: Discharge Planning  Goal: Discharge to home or other facility with appropriate resources  1/21/2023 1140 by Shira Ngyuen RN  Outcome: Progressing  1/20/2023 2223 by [place] Marinelli RN  Outcome: Progressing  Flowsheets (Taken 1/20/2023 1929)  Discharge to home or other facility with appropriate resources: Identify barriers to discharge with patient and caregiver     Problem: Infection - Adult  Goal: Absence of infection at discharge  1/21/2023 1140 by Shira Nguyen RN  Outcome: Progressing  1/20/2023 2223 by [place] Marinelli RN  Outcome: Progressing  Flowsheets (Taken 1/20/2023 1929)  Absence of infection at discharge: Assess and monitor for signs and symptoms of infection     Problem: Pain  Goal: Verbalizes/displays adequate comfort level or baseline comfort level  1/21/2023 1140 by Shira Nguyen RN  Outcome: Adequate for Discharge  1/20/2023 2223 by [place] Marinelli RN  Outcome: Progressing     Problem: Gastrointestinal - Adult  Goal: Minimal or absence of nausea and vomiting  1/21/2023 1140 by Shira Nguyen RN  Outcome: Not Progressing  1/20/2023 2223 by [place] Marinelli RN  Outcome: Progressing  Flowsheets (Taken 1/20/2023 1929)  Minimal or absence of nausea and vomiting: Administer IV fluids as ordered to ensure adequate hydration
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
## 2023-01-21 NOTE — PROGRESS NOTES
[facility]  Office: [address], DO, Valencia Murrell, DO, Jenice Duane, DO, Nick Shore, DO, Naun Estrada MD, Mark Lozada MD, Dayton Fernandez MD, Eunice Ventura MD,  Raj Boone MD, Tawanna Garcia MD, Leatha Sinha, DO, Candie Jiménez MD,  Jin Yates MD, Paul Curiel MD, Holley Quevedo, DO, Lianet Gamboa MD, Maggy Domínguez MD, Verito Akhtar DO, Ascencion Norwood MD, Isiah Sherman MD, James Niocle MD, Judy Mahoney MD, Radha Toussaint DO, Yesenia Ram MD, Nubia Cabral MD, Mindy Michaels, CNP,  Sharda Vital, CNP, Emily Walters CNP, Delbert Aase, CNP,  Haley Arevalo, [facility], Charley Rivera, CNP, Larissa Patrick CNP, Corazon Lao CNP, Nirali Chavez [facility], [facility], CNP, Celso Reddy PA-C, Tanda Lesches, CNS, Rachid Lozada, CNP, Edwar Hunt, [facility]    Progress Note    1/21/2023    3:00 PM    Name:   Tariq He  MRN:     [id number]     Acct:      [de-identified]   Room:   2019/2019-02   Day:  1  Admit Date:  1/20/2023  9:32 AM    PCP:   KENNETH Serrano CNP  Code Status:  Full Code    Subjective:     C/C:   Chief Complaint   Patient presents with    Flank Pain     L, onset last night      Abdominal Pain     Generalized abdominal pain; hx of diverticulitis 12/25/22, ATB completed     Interval History Status: .   Still having pain in ascending and descending colon areas of abdomen, she is on Cipro and Flagyl  Denies nausea vomiting  Passing flatus  States she had multiple attacks of diverticulitis since 2008, has not seen GI anytime and did not have colonoscopy  Brief History:     Patient presents to the emergency room today with complaints of left lower quadrant abdominal pain that radiates to her back. Patient has a significant past medical history of hypothyroidism and diverticulitis.   Patient states that back in December she started to experience left lower quadrant abdominal pain and was treated by her PCP with Cipro and Augmentin however, her pain never fully went away. Yesterday her abdominal pain significantly intensified. Patient was experiencing chills, nausea, diarrhea and did notice blood in her stools yesterday. Patient denies ever having a colonoscopy or following up with GI. Patient states that she has noted her stools to be flat and feels as if at times it is difficult to go. Patient denies any recent fevers, chest pain and vomiting. Patient was following the BRAT diet without symptom relief. Throughout the emergency room evaluation it was noted that her chloride is 109. WBC 11.7. Hemoglobin 15.5. CT abdomen and pelvis shows:   1. Acute uncomplicated diverticulitis in the proximal descending segment. 2.  Interval resolution of previously seen distal descending/proximal sigmoid   diverticulitis. Review of Systems:     Constitutional:  negative for chills, fevers, sweats  Respiratory:  negative for cough, dyspnea on exertion, shortness of breath, wheezing  Cardiovascular:  negative for chest pain, chest pressure/discomfort, lower extremity edema, palpitations  Gastrointestinal:  + for abdominal pain, denies constipation, diarrhea, nausea, vomiting  Neurological:  negative for dizziness, headache    Medications: Allergies:     Allergies   Allergen Reactions    Latex     Trazodone Anaphylaxis    Trazodone And Nefazodone        Current Meds:   Scheduled Meds:    sodium chloride  80 mL IntraVENous Once    thyroid  60 mg Oral Daily    sodium chloride flush  5-40 mL IntraVENous 2 times per day    enoxaparin  40 mg SubCUTAneous Daily    metroNIDAZOLE  500 mg IntraVENous Q8H    ciprofloxacin  400 mg IntraVENous Q12H     Continuous Infusions:    sodium chloride      sodium chloride 125 mL/hr at 01/21/23 0545     PRN Meds: sodium chloride flush, sodium chloride flush, sodium chloride, potassium chloride **OR** potassium alternative oral replacement **OR** potassium chloride, magnesium sulfate, ondansetron **OR** ondansetron, acetaminophen **OR** acetaminophen, polyethylene glycol, HYDROmorphone    Data:     Past Medical History:   has a past medical history of Hypothyroidism. Social History:   reports that she has been smoking cigarettes. She has been smoking an average of .25 packs per day. She has never used smokeless tobacco. She reports current drug use. Drug: Marijuana Varela Kian). Family History: History reviewed. No pertinent family history. Vitals:  /73   Pulse 71   Temp 98.5 °F (36.9 °C) (Oral)   Resp 16   Ht 5' 2\" (1.575 m)   Wt 188 lb (85.3 kg)   LMP 2022 (Exact Date)   SpO2 97%   BMI 34.39 kg/m²   Temp (24hrs), Av.4 °F (36.9 °C), Min:97.9 °F (36.6 °C), Max:98.8 °F (37.1 °C)    No results for input(s): POCGLU in the last 72 hours. I/O (24Hr):     Intake/Output Summary (Last 24 hours) at 2023 1500  Last data filed at 2023 0805  Gross per 24 hour   Intake 165.93 ml   Output 2600 ml   Net -2434.07 ml       Labs:  Hematology:  Recent Labs     23  1000 23  0653   WBC 11.7* 9.3   RBC 5.25* 4.43   HGB 15.5* 12.6   HCT 45.9 40.4   MCV 87.4 91.2   MCH 29.5 28.4   MCHC 33.8 31.2   RDW 14.6* 13.7    208   MPV 12.4 9.4     Chemistry:  Recent Labs     23  1000 23  0653    138   K 3.7 3.3*   * 103   CO2 22 25   GLUCOSE 89 111*   BUN 6 5*   CREATININE 0.72 0.76   MG  --  1.8   ANIONGAP 11 10   LABGLOM >60 >60   CALCIUM 9.2 8.9   PHOS  --  2.5*     Recent Labs     23  1000 23  0653   PROT 7.1 6.3*   LABALBU 4.2 3.5   AST 23 16   ALT 20 15   ALKPHOS 71 61   BILITOT 0.9 1.0   LIPASE 20 49     ABG:No results found for: POCPH, PHART, PH, POCPCO2, EYC0IOZ, PCO2, POCPO2, PO2ART, PO2, POCHCO3, CXD8ZQQ, HCO3, NBEA, PBEA, BEART, BE, THGBART, THB, YYU1XKC, OHTQ8QDJ, T2HISAGD, O2SAT, FIO2  No results found for: SPECIAL  No results found for: CULTURE    Radiology:  CT ABDOMEN PELVIS W IV CONTRAST Additional Contrast? None    Result Date: 1/20/2023  1. Acute uncomplicated diverticulitis in the proximal descending segment. 2.  Interval resolution of previously seen distal descending/proximal sigmoid diverticulitis.        Physical Examination:        General appearance:  alert, cooperative and no distress, obese  Mental Status:  oriented to person, place and time and normal affect  Lungs:  clear to auscultation bilaterally, normal effort  Heart:  regular rate and rhythm, no murmur  Abdomen:  + Discomfort in descending and ascending colon area as well as in suprapubic area, bowel sounds are present  Extremities:  no edema, redness, tenderness in the calves  Skin:  no gross lesions, rashes, induration    Assessment:        Hospital Problems             Last Modified POA    * (Principal) Acute diverticulitis 1/20/2023 Yes    Diverticulosis 1/20/2023 Yes    LLQ abdominal pain 1/20/2023 Yes    Hypothyroidism 1/20/2023 Yes       Plan:        Continue IV Cipro and Flagyl  IV fluids  Pain control  DVT prophylaxis  Discussed need for GI evaluation for colonoscopy as she might need surgery due to recurrent attacks of diverticulitis    Blue Dos Santos MD  1/21/2023  3:00 PM

## 2023-01-21 NOTE — PROGRESS NOTES
[facility]  PROGRESS NOTE    Room # 2019/2019-02   Name: Rahul Donovan              Reason for visit: Routine    I visited the patient. Admit Date & Time: 1/20/2023  9:32 AM    Assessment:  Rahul Donovan is a 56 y.o. female. Upon entering the room patient states about their medical condition, States about things events in her past. states struggles with their medical situation. States worries, fears frustrations. Patient states well , treated well. Patient states good family support, shares about spiritual life, Lutheran background, shares Lutheran beliefs. Patient shares about outside interests. Intervention:   provided a ministry presence, listening and prayer. Outcome:  Patient open to visit. Plan:  Chaplains will remain available to offer spiritual and emotional support as needed. Electronically signed by Chaplain Marcy, on 1/21/2023 at 3:50 PM.  Oswald      01/21/23 1547   Encounter Summary   Service Provided For: Patient   Referral/Consult From: [facility]   Support System Spouse   Last Encounter  01/21/23   Complexity of Encounter High   Begin Time 0310   End Time  1548   Total Time Calculated 758 min   Encounter    Type Initial Screen/Assessment   Spiritual/Emotional needs   Type Spiritual Support;Spiritual Distress; Emotional Distress   Grief, Loss, and Adjustments   Type Grief and loss; Life Adjustments; Adjustment to illness   Assessment/Intervention/Outcome   Assessment Anxious; Complicated grieving;Fearful;Powerlessness; Tearful   Intervention Active listening;Discussed belief system/Lutheran practices/josselin;Discussed illness injury and its impact;Prayer (assurance of)/Paradox;Sustaining Presence/Ministry of presence; Explored/Affirmed feelings, thoughts, concerns   Outcome Comfort;Encouraged;Engaged in conversation;Expressed feelings, needs, and concerns;Expressed Gratitude;Receptive;Venting emotion

## 2023-01-22 ENCOUNTER — APPOINTMENT (OUTPATIENT)
Dept: GENERAL RADIOLOGY | Age: 48
DRG: 392 | End: 2023-01-22
Payer: COMMERCIAL

## 2023-01-22 LAB
C DIFF AG + TOXIN: ABNORMAL
C DIFFICILE TOXINS, PCR: ABNORMAL
CAMPYLOBACTER PCR: NORMAL
E COLI ENTEROTOXIGENIC PCR: NORMAL
PLESIOMONAS SHIGELLOIDES PCR: NORMAL
SALMONELLA PCR: NORMAL
SHIGATOXIN GENE PCR: NORMAL
SHIGELLA SP PCR: NORMAL
SPECIMEN DESCRIPTION: ABNORMAL
SPECIMEN DESCRIPTION: ABNORMAL
SPECIMEN DESCRIPTION: NORMAL
VIBRIO PCR: NORMAL
YERSINIA ENTEROCOLITICA PCR: NORMAL

## 2023-01-22 PROCEDURE — 2500000003 HC RX 250 WO HCPCS: Performed by: NURSE PRACTITIONER

## 2023-01-22 PROCEDURE — 99231 SBSQ HOSP IP/OBS SF/LOW 25: CPT | Performed by: INTERNAL MEDICINE

## 2023-01-22 PROCEDURE — 2580000003 HC RX 258: Performed by: NURSE PRACTITIONER

## 2023-01-22 PROCEDURE — 74018 RADEX ABDOMEN 1 VIEW: CPT

## 2023-01-22 PROCEDURE — 6360000002 HC RX W HCPCS: Performed by: NURSE PRACTITIONER

## 2023-01-22 PROCEDURE — 6370000000 HC RX 637 (ALT 250 FOR IP): Performed by: INTERNAL MEDICINE

## 2023-01-22 PROCEDURE — 99232 SBSQ HOSP IP/OBS MODERATE 35: CPT | Performed by: INTERNAL MEDICINE

## 2023-01-22 PROCEDURE — APPSS30 APP SPLIT SHARED TIME 16-30 MINUTES: Performed by: NURSE PRACTITIONER

## 2023-01-22 PROCEDURE — 1200000000 HC SEMI PRIVATE

## 2023-01-22 PROCEDURE — 6370000000 HC RX 637 (ALT 250 FOR IP): Performed by: NURSE PRACTITIONER

## 2023-01-22 RX ORDER — METRONIDAZOLE 500 MG/1
500 TABLET ORAL 3 TIMES DAILY
Qty: 21 TABLET | Refills: 0 | Status: SHIPPED | OUTPATIENT
Start: 2023-01-22 | End: 2023-01-23 | Stop reason: SDUPTHER

## 2023-01-22 RX ORDER — CIPROFLOXACIN 500 MG/1
500 TABLET, FILM COATED ORAL 2 TIMES DAILY
Qty: 10 TABLET | Refills: 0 | Status: SHIPPED | OUTPATIENT
Start: 2023-01-22 | End: 2023-01-23 | Stop reason: SDUPTHER

## 2023-01-22 RX ORDER — DICYCLOMINE HYDROCHLORIDE 10 MG/1
10 CAPSULE ORAL 3 TIMES DAILY PRN
Qty: 15 CAPSULE | Refills: 1 | Status: SHIPPED | OUTPATIENT
Start: 2023-01-22

## 2023-01-22 RX ORDER — FAMOTIDINE 20 MG/1
20 TABLET, FILM COATED ORAL 2 TIMES DAILY
Qty: 60 TABLET | Refills: 0 | Status: SHIPPED | OUTPATIENT
Start: 2023-01-22

## 2023-01-22 RX ADMIN — HYDROMORPHONE HYDROCHLORIDE 1 MG: 1 INJECTION, SOLUTION INTRAMUSCULAR; INTRAVENOUS; SUBCUTANEOUS at 17:36

## 2023-01-22 RX ADMIN — ONDANSETRON 4 MG: 2 INJECTION INTRAMUSCULAR; INTRAVENOUS at 08:53

## 2023-01-22 RX ADMIN — CIPROFLOXACIN 400 MG: 2 INJECTION, SOLUTION INTRAVENOUS at 18:41

## 2023-01-22 RX ADMIN — LEVOTHYROXINE, LIOTHYRONINE 60 MG: 19; 4.5 TABLET ORAL at 08:59

## 2023-01-22 RX ADMIN — METRONIDAZOLE 500 MG: 500 INJECTION, SOLUTION INTRAVENOUS at 01:34

## 2023-01-22 RX ADMIN — ONDANSETRON 4 MG: 2 INJECTION INTRAMUSCULAR; INTRAVENOUS at 17:36

## 2023-01-22 RX ADMIN — SODIUM CHLORIDE: 9 INJECTION, SOLUTION INTRAVENOUS at 17:33

## 2023-01-22 RX ADMIN — CIPROFLOXACIN 400 MG: 2 INJECTION, SOLUTION INTRAVENOUS at 05:13

## 2023-01-22 RX ADMIN — VANCOMYCIN HYDROCHLORIDE 250 MG: KIT at 18:13

## 2023-01-22 RX ADMIN — METRONIDAZOLE 500 MG: 500 INJECTION, SOLUTION INTRAVENOUS at 09:01

## 2023-01-22 RX ADMIN — HYDROMORPHONE HYDROCHLORIDE 1 MG: 1 INJECTION, SOLUTION INTRAMUSCULAR; INTRAVENOUS; SUBCUTANEOUS at 08:53

## 2023-01-22 RX ADMIN — HYDROMORPHONE HYDROCHLORIDE 1 MG: 1 INJECTION, SOLUTION INTRAMUSCULAR; INTRAVENOUS; SUBCUTANEOUS at 21:47

## 2023-01-22 RX ADMIN — HYDROMORPHONE HYDROCHLORIDE 1 MG: 1 INJECTION, SOLUTION INTRAMUSCULAR; INTRAVENOUS; SUBCUTANEOUS at 03:16

## 2023-01-22 RX ADMIN — METRONIDAZOLE 500 MG: 500 INJECTION, SOLUTION INTRAVENOUS at 17:27

## 2023-01-22 RX ADMIN — HYDROMORPHONE HYDROCHLORIDE 1 MG: 1 INJECTION, SOLUTION INTRAMUSCULAR; INTRAVENOUS; SUBCUTANEOUS at 13:16

## 2023-01-22 RX ADMIN — SODIUM CHLORIDE: 9 INJECTION, SOLUTION INTRAVENOUS at 03:20

## 2023-01-22 ASSESSMENT — PAIN DESCRIPTION - DESCRIPTORS
DESCRIPTORS: CRAMPING
DESCRIPTORS: ACHING;DISCOMFORT;SORE
DESCRIPTORS: CRAMPING
DESCRIPTORS: CRAMPING;PRESSURE
DESCRIPTORS: CRAMPING

## 2023-01-22 ASSESSMENT — PAIN DESCRIPTION - FREQUENCY
FREQUENCY: CONTINUOUS

## 2023-01-22 ASSESSMENT — PAIN - FUNCTIONAL ASSESSMENT
PAIN_FUNCTIONAL_ASSESSMENT: ACTIVITIES ARE NOT PREVENTED

## 2023-01-22 ASSESSMENT — PAIN DESCRIPTION - LOCATION
LOCATION: ABDOMEN

## 2023-01-22 ASSESSMENT — PAIN DESCRIPTION - ONSET
ONSET: ON-GOING

## 2023-01-22 ASSESSMENT — PAIN DESCRIPTION - PAIN TYPE
TYPE: ACUTE PAIN

## 2023-01-22 ASSESSMENT — PAIN DESCRIPTION - ORIENTATION
ORIENTATION: LEFT;LOWER;MID
ORIENTATION: LEFT;LOWER;MID
ORIENTATION: RIGHT
ORIENTATION: LEFT
ORIENTATION: LEFT;LOWER;MID

## 2023-01-22 ASSESSMENT — PAIN SCALES - GENERAL
PAINLEVEL_OUTOF10: 7
PAINLEVEL_OUTOF10: 3
PAINLEVEL_OUTOF10: 3
PAINLEVEL_OUTOF10: 7
PAINLEVEL_OUTOF10: 8
PAINLEVEL_OUTOF10: 3
PAINLEVEL_OUTOF10: 4
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
## 2023-01-22 NOTE — PROGRESS NOTES
Colorectal Surgery:  Daily Progress Note                PATIENT NAME: Rodrigo Redd     TODAY'S DATE: 1/22/2023, 8:23 AM  SUBJECTIVE:     Pt seen and examined at bedside. VSS. Pain is controlled but remains present in LLQ. No nausea or vomiting. Passing gas, no stool overnight. OBJECTIVE:   VITALS:  /76   Pulse 91   Temp 99 °F (37.2 °C) (Oral)   Resp 18   Ht 5' 2\" (1.575 m)   Wt 198 lb (89.8 kg)   LMP 12/28/2022 (Exact Date)   SpO2 95%   BMI 36.21 kg/m²      INTAKE/OUTPUT:      Intake/Output Summary (Last 24 hours) at 1/22/2023 9838  Last data filed at 1/22/2023 0324  Gross per 24 hour   Intake 3787.21 ml   Output 1250 ml   Net 2537.21 ml       PHYSICAL EXAM:  General Appearance: awake, alert, oriented, in no acute distress  HEENT:  Normocephalic, atraumatic, mucus membranes moist   Heart: Heart regular rate and rhythm  Lungs: Unlabored, room air  Abdomen:Soft, nondistended, TTP in LLQ, nonperitoneal   Extremities: No cyanosis, pitting edema, rashes noted.       Data:  CBC with Differential:    Lab Results   Component Value Date/Time    WBC 9.3 01/21/2023 06:53 AM    RBC 4.43 01/21/2023 06:53 AM    HGB 12.6 01/21/2023 06:53 AM    HCT 40.4 01/21/2023 06:53 AM     01/21/2023 06:53 AM    MCV 91.2 01/21/2023 06:53 AM    MCH 28.4 01/21/2023 06:53 AM    MCHC 31.2 01/21/2023 06:53 AM    RDW 13.7 01/21/2023 06:53 AM    LYMPHOPCT 26 01/21/2023 06:53 AM    MONOPCT 7 01/21/2023 06:53 AM    BASOPCT 1 01/21/2023 06:53 AM    MONOSABS 0.60 01/21/2023 06:53 AM    LYMPHSABS 2.38 01/21/2023 06:53 AM    EOSABS 0.05 01/21/2023 06:53 AM    BASOSABS 0.05 01/21/2023 06:53 AM    DIFFTYPE NOT REPORTED 11/06/2019 11:32 AM     BMP:    Lab Results   Component Value Date/Time     01/21/2023 06:53 AM    K 3.3 01/21/2023 06:53 AM     01/21/2023 06:53 AM    CO2 25 01/21/2023 06:53 AM    BUN 5 01/21/2023 06:53 AM    LABALBU 3.5 01/21/2023 06:53 AM    CREATININE 0.76 01/21/2023 06:53 AM    CALCIUM 8.9 01/21/2023 06:53 AM    GFRAA >60 07/29/2022 06:04 AM    LABGLOM >60 01/21/2023 06:53 AM    GLUCOSE 111 01/21/2023 06:53 AM       Radiology Review:    Pending KUB    ASSESSMENT:  Active Hospital Problems    Diagnosis Date Noted    Acute diverticulitis [K57.92] 01/20/2023     Priority: Medium    LLQ abdominal pain [R10.32] 07/27/2022     Priority: Medium    Diverticulosis [K57.90] 07/26/2022     Priority: Medium    Hypothyroidism [E03.9]        56 y.o. female with recurrent uncomplicated diverticulitis    Plan:  Appreciate Medicine and GI recommendations  OK for diet as tolerated as patient is having bowel function  Continue IV abx while in house and transition to oral at time of discharge, patient did test positive for c. Diff   Patient requested KUB to evaluate for stool burden - ordered and pending  Discussed options for noninvasive colon imaging with the patient which should be performed 6-8 weeks after this acute episode  Colorectal Surgery to sign off at this time. Please call back with questions or concerns - contact information for surgeons available for follow up provided in discharge instructions    Electronically signed by Polo Jones MD  on 1/22/2023 at 8:23 AM     I Dr. Nathaniel Harmon saw and examined the patient. I have edited the above and agree with the above. Rudi Black  Colorectal Surgery

## 2023-01-22 NOTE — PROGRESS NOTES
GI Progress notes    1/22/2023   8:50 AM    Name:  Kel Winters  MRN:    [id number]     Acct:     [de-identified]   Room:  2019/2019-02  IP Day: 2     Admit Date: 1/20/2023  9:32 AM  PCP: KENNETH Fam CNP    Subjective:     C/C:   Chief Complaint   Patient presents with    Flank Pain     L, onset last night      Abdominal Pain     Generalized abdominal pain; hx of diverticulitis 12/25/22, ATB completed       Interval History: Status: improved. Patient seen and examined  No acute events overnight  KUB - nonobstructive bowel gas pattern, no increased stool load  Was evaluated by surgeon. Will consider outpatient elective sigmoid colectomy. Continues to have mild LLQ pain  No nausea, vomiting  C.difficile was positive    ROS:  Constitutional: negative for chills, fevers and sweats  Gastrointestinal: negative for constipation, diarrhea, nausea and vomiting  Neurological: negative for dizziness and headaches    Medications: Allergies:    Allergies   Allergen Reactions    Latex     Trazodone Anaphylaxis    Trazodone And Nefazodone        Current Meds: sodium chloride flush 0.9 % injection 10 mL, PRN  0.9 % sodium chloride bolus, Once  thyroid (ARMOUR) tablet 60 mg, Daily  sodium chloride flush 0.9 % injection 5-40 mL, 2 times per day  sodium chloride flush 0.9 % injection 10 mL, PRN  0.9 % sodium chloride infusion, PRN  potassium chloride (KLOR-CON M) extended release tablet 40 mEq, PRN   Or  potassium bicarb-citric acid (EFFER-K) effervescent tablet 40 mEq, PRN   Or  potassium chloride 10 mEq/100 mL IVPB (Peripheral Line), PRN  magnesium sulfate 1000 mg in dextrose 5% 100 mL IVPB, PRN  enoxaparin (LOVENOX) injection 40 mg, Daily  ondansetron (ZOFRAN-ODT) disintegrating tablet 4 mg, Q8H PRN   Or  ondansetron (ZOFRAN) injection 4 mg, Q6H PRN  acetaminophen (TYLENOL) tablet 650 mg, Q6H PRN   Or  acetaminophen (TYLENOL) suppository 650 mg, Q6H PRN  polyethylene glycol (GLYCOLAX) packet 17 g, Daily PRN  0.9 % sodium chloride infusion, Continuous  metronidazole (FLAGYL) 500 mg in 0.9% NaCl 100 mL IVPB premix, Q8H  ciprofloxacin (CIPRO) IVPB 400 mg, Q12H  HYDROmorphone HCl PF (DILAUDID) injection 1 mg, Q4H PRN        Data:     Code Status:  Full Code    History reviewed. No pertinent family history. Social History     Socioeconomic History    Marital status:      Spouse name: Not on file    Number of children: Not on file    Years of education: Not on file    Highest education level: Not on file   Occupational History    Not on file   Tobacco Use    Smoking status: Light Smoker     Packs/day: 0.25     Types: Cigarettes    Smokeless tobacco: Never   Substance and Sexual Activity    Alcohol use: Not on file    Drug use: Yes     Types: Marijuana Nohelia Carson)    Sexual activity: Not on file   Other Topics Concern    Not on file   Social History Narrative    Not on file     Social Determinants of Health     Financial Resource Strain: Not on file   Food Insecurity: Not on file   Transportation Needs: Not on file   Physical Activity: Not on file   Stress: Not on file   Social Connections: Not on file   Intimate Partner Violence: Not on file   Housing Stability: Not on file       Vitals:  /76   Pulse 91   Temp 99 °F (37.2 °C) (Oral)   Resp 18   Ht 5' 2\" (1.575 m)   Wt 198 lb (89.8 kg)   LMP 2022 (Exact Date)   SpO2 95%   BMI 36.21 kg/m²   Temp (24hrs), Av.6 °F (37 °C), Min:98.1 °F (36.7 °C), Max:99 °F (37.2 °C)    No results for input(s): POCGLU in the last 72 hours. I/O (24Hr):     Intake/Output Summary (Last 24 hours) at 2023 0850  Last data filed at 2023 0324  Gross per 24 hour   Intake 3787.21 ml   Output 1250 ml   Net 2537.21 ml       Labs:      CBC:   Lab Results   Component Value Date/Time    WBC 9.3 2023 06:53 AM    RBC 4.43 2023 06:53 AM    HGB 12.6 2023 06:53 AM    HCT 40.4 2023 06:53 AM    MCV 91.2 2023 06:53 AM    MCH 28.4 2023 06:53 AM    [facility] 31.2 01/21/2023 06:53 AM    RDW 13.7 01/21/2023 06:53 AM     01/21/2023 06:53 AM    MPV 9.4 01/21/2023 06:53 AM     CBC with Differential:    Lab Results   Component Value Date/Time    WBC 9.3 01/21/2023 06:53 AM    RBC 4.43 01/21/2023 06:53 AM    HGB 12.6 01/21/2023 06:53 AM    HCT 40.4 01/21/2023 06:53 AM     01/21/2023 06:53 AM    MCV 91.2 01/21/2023 06:53 AM    MCH 28.4 01/21/2023 06:53 AM    MCHC 31.2 01/21/2023 06:53 AM    RDW 13.7 01/21/2023 06:53 AM    LYMPHOPCT 26 01/21/2023 06:53 AM    MONOPCT 7 01/21/2023 06:53 AM    BASOPCT 1 01/21/2023 06:53 AM    MONOSABS 0.60 01/21/2023 06:53 AM    LYMPHSABS 2.38 01/21/2023 06:53 AM    EOSABS 0.05 01/21/2023 06:53 AM    BASOSABS 0.05 01/21/2023 06:53 AM    DIFFTYPE NOT REPORTED 11/06/2019 11:32 AM     Hemoglobin/Hematocrit:    Lab Results   Component Value Date/Time    HGB 12.6 01/21/2023 06:53 AM    HCT 40.4 01/21/2023 06:53 AM     CMP:    Lab Results   Component Value Date/Time     01/21/2023 06:53 AM    K 3.3 01/21/2023 06:53 AM     01/21/2023 06:53 AM    CO2 25 01/21/2023 06:53 AM    BUN 5 01/21/2023 06:53 AM    CREATININE 0.76 01/21/2023 06:53 AM    GFRAA >60 07/29/2022 06:04 AM    LABGLOM >60 01/21/2023 06:53 AM    GLUCOSE 111 01/21/2023 06:53 AM    PROT 6.3 01/21/2023 06:53 AM    LABALBU 3.5 01/21/2023 06:53 AM    CALCIUM 8.9 01/21/2023 06:53 AM    BILITOT 1.0 01/21/2023 06:53 AM    ALKPHOS 61 01/21/2023 06:53 AM    AST 16 01/21/2023 06:53 AM    ALT 15 01/21/2023 06:53 AM     BMP:    Lab Results   Component Value Date/Time     01/21/2023 06:53 AM    K 3.3 01/21/2023 06:53 AM     01/21/2023 06:53 AM    CO2 25 01/21/2023 06:53 AM    BUN 5 01/21/2023 06:53 AM    LABALBU 3.5 01/21/2023 06:53 AM    CREATININE 0.76 01/21/2023 06:53 AM    CALCIUM 8.9 01/21/2023 06:53 AM    GFRAA >60 07/29/2022 06:04 AM    LABGLOM >60 01/21/2023 06:53 AM    GLUCOSE 111 01/21/2023 06:53 AM     PT/INR:    Lab Results   Component Value Date/Time PROTIME 11.1 11/07/2019 05:18 AM    INR 1.1 11/07/2019 05:18 AM     PTT:  No results found for: APTT, PTT[APTT}    Physical Examination:        General appearance: alert, cooperative and no distress  Mental Status: oriented to person, place and time and normal affect  Abdomen: soft, nontender, nondistended, bowel sounds present  Extremities: no edema, redness or tenderness in the calves  Skin: no gross lesions, rashes, or induration    Assessment:        Primary Problem  Acute diverticulitis     Active Hospital Problems    Diagnosis Date Noted    Acute diverticulitis [K57.92] 01/20/2023     Priority: Medium    LLQ abdominal pain [R10.32] 07/27/2022     Priority: Medium    Diverticulosis [K57.90] 07/26/2022     Priority: Medium    Hypothyroidism [E03.9]      Past Medical History:   Diagnosis Date    Hypothyroidism         Plan:        Acute, uncomplicated, recurrent diverticulitis  Soft, low-fiber diet as tolerated  Continue antibiotics  Evaluated by surgery  Outpatient follow-up for possible elective sigmoid colectomy  May d/c per GI, GI signing off    Time spent reviewing the chart, seeing the patient, and discussing with the attending MD around 30 minutes. Explained to the patient and d/W Nursing Staff  Will F/U with you  Please call or Page for any issues or change in status  Thanks    Electronically signed by KENNETH Amaya NP on 1/22/2023 at 8:50 AM       GI attending physician note. Patient seen with APRN     I independently performed an evaluation on [facility]. I have reviewed the above documentation completed by the Nurse Practitioner and agree with the history, examination, and management plan. Recommendations discussed. Patient visited along with GI APRN. Abdominal examination nonspecific. No significant tenderness in the left flank. Patient does not have fever. Tolerating liquid diet.     Recommendations,'s soft diet, precautions to avoid constipation, laxatives to help bowel movements, as per surgery recommendation, elective sigmoid colectomy.     Discussed with Sabrina Camp MD

## 2023-01-22 NOTE — PROGRESS NOTES
[facility]  Office: [address], DO, Randi Biswas, DO, Shelley Fitzgerald, DO, Cari Shore, DO, Edward Villalba MD, Brando Figueroa MD, Fely Meredith MD, Christy Benitez MD,  Raghavendra King MD, Anastasiia Mclaughlin MD, Darlene Mckoy, DO, Sheridan Lucas MD,  Anastacio Beth MD, Dena Adams MD, Charlie Boyce, DO, Bc Mckeon MD, Josef Hanna MD, Bharathi Ott, DO, Maryellen Garnica MD, Sharan Hernandez MD, Kayli Gomez MD, Bhaskar Fonseca MD, Sarai Knight, DO, Amy Wong MD, Felton Dorado MD, Tin Miranda, CNP,  May Romo, CNP, Speedy Gould, CNP, Dilma Rosenberg, CNP,  Kyle Arteaga, [facility], Alberto Arenas, CNP, Cecy Abdullahi, CNP, Elieser Blake, CNP, Fariha Sarah, CNP, Honey Hurt, CNP, Jenny Guzman, PA-C, Allyson Gill, CNS, Maria Del Carmen Felton, [facility], Osmar Zhangs [facility]    Progress Note    1/22/2023    12:03 PM    Name:   Rahul Donovan  MRN:     [id number]     Acct:      [de-identified]   Room:   2019/2019-02   Day:  2  Admit Date:  1/20/2023  9:32 AM    PCP:   KENNETH Zaldivar CNP  Code Status:  Full Code    Subjective:     C/C:   Chief Complaint   Patient presents with    Flank Pain     L, onset last night      Abdominal Pain     Generalized abdominal pain; hx of diverticulitis 12/25/22, ATB completed     Interval History Status: Pinky Angles    Mild pain in descending colon needed only, she is on Cipro and Flagyl  Denies nausea vomiting, no loose stools, had bowel movement yesterday  C. difficile came positive  KUB showed nonobstructive bowel gas pattern, no increased stool load  States she had multiple attacks of diverticulitis since 2008, has not seen GI anytime and did not have colonoscopy  Evaluated by GI and colorectal surgery, she has refused colonoscopy ,she will consider outpatient elective colectomy and will follow up with colorectal surgeon for this if decides  Brief History: Patient presents to the emergency room today with complaints of left lower quadrant abdominal pain that radiates to her back. Patient has a significant past medical history of hypothyroidism and diverticulitis. Patient states that back in December she started to experience left lower quadrant abdominal pain and was treated by her PCP with Cipro and Augmentin however, her pain never fully went away. Yesterday her abdominal pain significantly intensified. Patient was experiencing chills, nausea, diarrhea and did notice blood in her stools yesterday. Patient denies ever having a colonoscopy or following up with GI. Patient states that she has noted her stools to be flat and feels as if at times it is difficult to go. Patient denies any recent fevers, chest pain and vomiting. Patient was following the BRAT diet without symptom relief. Throughout the emergency room evaluation it was noted that her chloride is 109. WBC 11.7. Hemoglobin 15.5. CT abdomen and pelvis shows:   1. Acute uncomplicated diverticulitis in the proximal descending segment. 2.  Interval resolution of previously seen distal descending/proximal sigmoid   diverticulitis. Review of Systems:     Constitutional:  negative for chills, fevers, sweats  Respiratory:  negative for cough, dyspnea on exertion, shortness of breath, wheezing  Cardiovascular:  negative for chest pain, chest pressure/discomfort, lower extremity edema, palpitations  Gastrointestinal:  + for  left lower quadrant abdominal pain, denies constipation, diarrhea, nausea, vomiting  Neurological:  negative for dizziness, headache    Medications: Allergies:     Allergies   Allergen Reactions    Latex     Trazodone Anaphylaxis    Trazodone And Nefazodone        Current Meds:   Scheduled Meds:    sodium chloride  80 mL IntraVENous Once    thyroid  60 mg Oral Daily    sodium chloride flush  5-40 mL IntraVENous 2 times per day    enoxaparin  40 mg SubCUTAneous Daily    metroNIDAZOLE  500 mg IntraVENous Q8H    ciprofloxacin  400 mg IntraVENous Q12H     Continuous Infusions:    sodium chloride      sodium chloride 125 mL/hr at 23 0324     PRN Meds: sodium chloride flush, sodium chloride flush, sodium chloride, potassium chloride **OR** potassium alternative oral replacement **OR** potassium chloride, magnesium sulfate, ondansetron **OR** ondansetron, acetaminophen **OR** acetaminophen, polyethylene glycol, HYDROmorphone    Data:     Past Medical History:   has a past medical history of Hypothyroidism. Social History:   reports that she has been smoking cigarettes. She has been smoking an average of .25 packs per day. She has never used smokeless tobacco. She reports current drug use. Drug: Marijuana Estil Catena). Family History: History reviewed. No pertinent family history. Vitals:  /71   Pulse 72   Temp 99 °F (37.2 °C) (Oral)   Resp 18   Ht 5' 2\" (1.575 m)   Wt 198 lb (89.8 kg)   LMP 2022 (Exact Date)   SpO2 95%   BMI 36.21 kg/m²   Temp (24hrs), Av.7 °F (37.1 °C), Min:98.1 °F (36.7 °C), Max:99 °F (37.2 °C)    No results for input(s): POCGLU in the last 72 hours. I/O (24Hr):     Intake/Output Summary (Last 24 hours) at 2023 1203  Last data filed at 2023 0324  Gross per 24 hour   Intake 3787.21 ml   Output 1250 ml   Net 2537.21 ml       Labs:  Hematology:  Recent Labs     23  1000 23  0653   WBC 11.7* 9.3   RBC 5.25* 4.43   HGB 15.5* 12.6   HCT 45.9 40.4   MCV 87.4 91.2   MCH 29.5 28.4   MCHC 33.8 31.2   RDW 14.6* 13.7    208   MPV 12.4 9.4     Chemistry:  Recent Labs     23  1000 23  0653    138   K 3.7 3.3*   * 103   CO2 22 25   GLUCOSE 89 111*   BUN 6 5*   CREATININE 0.72 0.76   MG  --  1.8   ANIONGAP 11 10   LABGLOM >60 >60   CALCIUM 9.2 8.9   PHOS  --  2.5*     Recent Labs     23  1000 23  0653   PROT 7.1 6.3*   LABALBU 4.2 3.5   AST 23 16   ALT 20 15   ALKPHOS 71 61 BILITOT 0.9 1.0   LIPASE 20 49     ABG:No results found for: POCPH, PHART, PH, POCPCO2, PKU8FFZ, PCO2, POCPO2, PO2ART, PO2, POCHCO3, QQD1XDH, HCO3, NBEA, PBEA, BEART, BE, THGBART, THB, MDP6FPY, MENA9ETC, G9GNBFCW, O2SAT, FIO2  No results found for: SPECIAL  No results found for: CULTURE    Radiology:  CT ABDOMEN PELVIS W IV CONTRAST Additional Contrast? None    Result Date: 1/20/2023  1. Acute uncomplicated diverticulitis in the proximal descending segment. 2.  Interval resolution of previously seen distal descending/proximal sigmoid diverticulitis.        Physical Examination:        General appearance:  alert, cooperative and no distress, obese  Mental Status:  oriented to person, place and time and normal affect  Lungs:  clear to auscultation bilaterally, normal effort  Heart:  regular rate and rhythm, no murmur  Abdomen:  + Discomfort in descending  colon area as well as in suprapubic area, bowel sounds are present  Extremities:  no edema, redness, tenderness in the calves  Skin:  no gross lesions, rashes, induration    Assessment:        Hospital Problems             Last Modified POA    * (Principal) Acute diverticulitis 1/20/2023 Yes    Diverticulosis 1/20/2023 Yes    LLQ abdominal pain 1/20/2023 Yes    Stool culture positive for Clostridioides difficile 1/22/2023 Yes    Hypothyroidism 1/20/2023 Yes     Plan:        Continue oral Cipro and Flagyl  Pain control  Discharge home, follow-up with colorectal surgeon/GI as outpatient if she decide for elective colectomy or colonoscopy    Maria D Oliver MD  1/22/2023  12:03 PM

## 2023-01-22 NOTE — PLAN OF CARE
Problem: Discharge Planning  Goal: Discharge to home or other facility with appropriate resources  1/22/2023 1836 by Tyler Su RN  Flowsheets (Taken 1/22/2023 0900)  Discharge to home or other facility with appropriate resources:   Identify barriers to discharge with patient and caregiver   Arrange for needed discharge resources and transportation as appropriate   Identify discharge learning needs (meds, wound care, etc)   Refer to discharge planning if patient needs post-hospital services based on physician order or complex needs related to functional status, cognitive ability or social support system     Problem: Pain  Goal: Verbalizes/displays adequate comfort level or baseline comfort level  1/22/2023 1836 by Tyler Su RN  Flowsheets (Taken 1/22/2023 0900)  Verbalizes/displays adequate comfort level or baseline comfort level:   Encourage patient to monitor pain and request assistance   Assess pain using appropriate pain scale   Administer analgesics based on type and severity of pain and evaluate response   Implement non-pharmacological measures as appropriate and evaluate response   Notify Licensed Independent Practitioner if interventions unsuccessful or patient reports new pain     Problem: Gastrointestinal - Adult  Goal: Minimal or absence of nausea and vomiting  1/22/2023 1836 by Tyler Su RN  Flowsheets (Taken 1/22/2023 0900)  Minimal or absence of nausea and vomiting:   Administer IV fluids as ordered to ensure adequate hydration   Administer ordered antiemetic medications as needed   Provide nonpharmacologic comfort measures as appropriate   Advance diet as tolerated, if ordered     Problem: Infection - Adult  Goal: Absence of infection at discharge  1/22/2023 1836 by Tyler Su RN  Flowsheets (Taken 1/22/2023 0900)  Absence of infection at discharge:   Assess and monitor for signs and symptoms of infection   Monitor lab/diagnostic results   Monitor all insertion sites i.e., indwelling lines, tubes and drains   Administer medications as ordered   Instruct and encourage patient and family to use good hand hygiene technique   Identify and instruct in appropriate isolation precautions for identified infection/condition

## 2023-01-22 NOTE — PLAN OF CARE
Pain level assessment complete.    Patient educated on pain scale and control interventions  PRN pain medication given per patient request  Patient instructed to call out with new onset of pain or unrelieved pain      Problem: Gastrointestinal - Adult  Goal: Minimal or absence of nausea and vomiting  1/21/2023 2154 by Rebecca Boogie RN  Outcome: Progressing  Flowsheets (Taken 1/21/2023 1936)  Minimal or absence of nausea and vomiting: Administer IV fluids as ordered to ensure adequate hydration  1/21/2023 1140 by Teddy Alvarado RN  Outcome: Not Progressing

## 2023-01-22 NOTE — PLAN OF CARE
[facility]  Office: [address], DO, Elisabeth Wallace, DO, Neymar Isai, DO, Christie Hall Blood, DO, Valeire Boggs MD, Mónica Nielsen MD, Raji Dykes MD, Landen Rodriguez MD,  Vandana Sharp MD, Rhonda Mendes MD, Julieth Yancey, DO, Constance Kuo MD,  Gisella Raymond MD, Lula Marroquin MD, Anastacio Thurston DO, Anders Keating MD, Chepe Mahmood MD, Cheyenne Rogers, DO, Jamee Zamora MD, Carrington Ortiz MD, Santy Mcdonald MD, Velasquez Manzanares MD, Radha Miranda, DO, Josefina Veloz MD, Rosa Perdomo MD, Edyta Jorge, CNP,  Cari Lawton, CNP, Catarina Kaur, CNP, Merlyn Youngblood, CNP,  Radha Chisholm, [facility], Tarsha Agrawal, CNP, Susanna Ricks, CNP, April Fermin, CNP, Quang Eason, CNP, Carolina Brush, CNP, Shyann Black, PA-C, Luis E Infante, CNS, Venkat Calderón, CNP, Oskar Hurt, [facility]    Second Visit Note  For more detailed information please refer to the progress note of the day      1/22/2023    4:58 PM    Name:   Alina Mckenna  MRN:     [id number]     Acct:      [de-identified]   Room:   2019/2019-02  IP Day:  2  Admit Date:  1/20/2023  9:32 AM    PCP:   KENNETH Fang CNP  Code Status:  Full Code      Pt vitals were reviewed   New labs were reviewed   Patient was seen    Updated plan :     Patient states after eating diet she again had loose stool and cramp in left lower abdomen  States she cannot tolerate oral Flagyl and wants to continue IV Flagyl for now  Stool C. difficile PCR was positive  We will continue IV Cipro and Flagyl and add oral vancomycin  Will hold discharge with plan to send her home tomorrow on oral vancomycin        Deana Mckay MD  1/22/2023  4:58 PM

## 2023-01-23 VITALS
BODY MASS INDEX: 36.99 KG/M2 | TEMPERATURE: 98.1 F | HEIGHT: 62 IN | OXYGEN SATURATION: 94 % | DIASTOLIC BLOOD PRESSURE: 80 MMHG | HEART RATE: 63 BPM | RESPIRATION RATE: 18 BRPM | WEIGHT: 201 LBS | SYSTOLIC BLOOD PRESSURE: 129 MMHG

## 2023-01-23 LAB — POTASSIUM SERPL-SCNC: 3.7 MMOL/L (ref 3.7–5.3)

## 2023-01-23 PROCEDURE — 36415 COLL VENOUS BLD VENIPUNCTURE: CPT

## 2023-01-23 PROCEDURE — 2500000003 HC RX 250 WO HCPCS: Performed by: NURSE PRACTITIONER

## 2023-01-23 PROCEDURE — 6360000002 HC RX W HCPCS: Performed by: NURSE PRACTITIONER

## 2023-01-23 PROCEDURE — 84132 ASSAY OF SERUM POTASSIUM: CPT

## 2023-01-23 PROCEDURE — 99231 SBSQ HOSP IP/OBS SF/LOW 25: CPT | Performed by: INTERNAL MEDICINE

## 2023-01-23 PROCEDURE — 6370000000 HC RX 637 (ALT 250 FOR IP): Performed by: NURSE PRACTITIONER

## 2023-01-23 RX ORDER — METRONIDAZOLE 500 MG/1
500 TABLET ORAL 3 TIMES DAILY
Qty: 21 TABLET | Refills: 0 | Status: SHIPPED | OUTPATIENT
Start: 2023-01-23 | End: 2023-01-30

## 2023-01-23 RX ORDER — CIPROFLOXACIN 500 MG/1
500 TABLET, FILM COATED ORAL 2 TIMES DAILY
Qty: 6 TABLET | Refills: 0 | Status: SHIPPED | OUTPATIENT
Start: 2023-01-23 | End: 2023-01-26

## 2023-01-23 RX ORDER — CIPROFLOXACIN 500 MG/1
500 TABLET, FILM COATED ORAL 2 TIMES DAILY
Qty: 6 TABLET | Refills: 0 | Status: SHIPPED | OUTPATIENT
Start: 2023-01-23 | End: 2023-01-23 | Stop reason: SDUPTHER

## 2023-01-23 RX ADMIN — ONDANSETRON 4 MG: 2 INJECTION INTRAMUSCULAR; INTRAVENOUS at 04:38

## 2023-01-23 RX ADMIN — HYDROMORPHONE HYDROCHLORIDE 1 MG: 1 INJECTION, SOLUTION INTRAMUSCULAR; INTRAVENOUS; SUBCUTANEOUS at 04:39

## 2023-01-23 RX ADMIN — HYDROMORPHONE HYDROCHLORIDE 1 MG: 1 INJECTION, SOLUTION INTRAMUSCULAR; INTRAVENOUS; SUBCUTANEOUS at 10:45

## 2023-01-23 RX ADMIN — CIPROFLOXACIN 400 MG: 2 INJECTION, SOLUTION INTRAVENOUS at 05:50

## 2023-01-23 RX ADMIN — METRONIDAZOLE 500 MG: 500 INJECTION, SOLUTION INTRAVENOUS at 09:14

## 2023-01-23 RX ADMIN — METRONIDAZOLE 500 MG: 500 INJECTION, SOLUTION INTRAVENOUS at 01:17

## 2023-01-23 RX ADMIN — ONDANSETRON 4 MG: 2 INJECTION INTRAMUSCULAR; INTRAVENOUS at 10:45

## 2023-01-23 RX ADMIN — LEVOTHYROXINE, LIOTHYRONINE 60 MG: 19; 4.5 TABLET ORAL at 09:07

## 2023-01-23 ASSESSMENT — PAIN - FUNCTIONAL ASSESSMENT: PAIN_FUNCTIONAL_ASSESSMENT: PREVENTS OR INTERFERES SOME ACTIVE ACTIVITIES AND ADLS

## 2023-01-23 ASSESSMENT — PAIN DESCRIPTION - LOCATION: LOCATION: ABDOMEN

## 2023-01-23 ASSESSMENT — PAIN SCALES - GENERAL: PAINLEVEL_OUTOF10: 7

## 2023-01-23 NOTE — PROGRESS NOTES
CLINICAL PHARMACY NOTE: MEDS TO BEDS    Total # of Prescriptions Filled: 2   The following medications were delivered to the patient:  Ciprofloxacin 500mg  Metronidazole 500mg    Additional Documentation:

## 2023-01-23 NOTE — PLAN OF CARE
Problem: Discharge Planning  Goal: Discharge to home or other facility with appropriate resources  1/22/2023 2322 by Yue Meza RN  Outcome: Progressing  Note: Patient will go home following this admit with education to follow on condition. Problem: Pain  Goal: Verbalizes/displays adequate comfort level or baseline comfort level  1/22/2023 2322 by Yue Meza RN  Outcome: Progressing  Note: Patient has as needed pain control and will ask for it. Problem: Gastrointestinal - Adult  Goal: Minimal or absence of nausea and vomiting  1/22/2023 2322 by Yue Meza RN  Outcome: Progressing  Note: Patient has minimal nausea and no vomiting at this time.       Problem: Infection - Adult  Goal: Absence of infection at discharge  1/22/2023 2322 by Yue Meza RN  Outcome: Progressing  Note: Patient will continue with oral antibiotic at discharge per physician

## 2023-01-23 NOTE — PROGRESS NOTES
[facility]  Office: [address], DO, Sy Petty, DO, Chaz Leblanc, DO, Gayle Krishnan Mone, DO, Brooklyn Schafer MD, Chilo Lan MD, Shelly Boles MD, Arpit Jimenez MD,  Tu Richardson MD, Clarisa Alicea MD, Kesha Aparicio DO, Adair Aaron MD,  Elisa Pringle MD, Johnson Fisher MD, Holly Stinson DO, Ligia Griffin MD, Becky Negrete MD, Latoya Sneed DO, Edwin Noguera MD, Kay Best MD, Severiano Cake, MD, oHng Lemon MD, Jerilyn Stewart, DO, Rhina Gardiner MD, Krysten Desouza MD, Kristina Kong, CNP,  Kingston Valdovinos CNP, Kary Guillen, CNP, Temo Price, CNP,  Merna Wright, [facility], Jackson Smith, CNP, Eyal Tomas CNP, Dave Chavez CNP, Nick Pineda, CNP, Felipe Barron, CNP, Rojelio Torres PA-C, Shanon Sigala, CNS, Jaswant Moss, CNP, Charles Bravo [facility]    Progress Note    1/23/2023    11:08 AM    Name:   Alfred Person  MRN:     [id number]     Acct:      [de-identified]   Room:   2019/2019-02   Day:  3  Admit Date:  1/20/2023  9:32 AM    PCP:   KENNETH Patton CNP  Code Status:  Full Code    Subjective:     C/C:   Chief Complaint   Patient presents with    Flank Pain     L, onset last night      Abdominal Pain     Generalized abdominal pain; hx of diverticulitis 12/25/22, ATB completed     Interval History Status: [place]    Mild pain in the left lower quadrant only, better than before  Has mild nausea, no vomiting  C. difficile came positive, oral vancomycin was added yesterday  No bowel movement overnight, wants to go home on Flagyl orally, does not want oral vancomycin at discharge  KUB showed nonobstructive bowel gas pattern, no increased stool load  States she had multiple attacks of diverticulitis since 2008, has not seen GI anytime and did not have colonoscopy  Evaluated by GI and colorectal surgery, she has refused colonoscopy ,she will consider outpatient elective colectomy and will follow up with colorectal surgeon for this if decides  Brief History:     Patient presents to the emergency room today with complaints of left lower quadrant abdominal pain that radiates to her back. Patient has a significant past medical history of hypothyroidism and diverticulitis. Patient states that back in December she started to experience left lower quadrant abdominal pain and was treated by her PCP with Cipro and Augmentin however, her pain never fully went away. Yesterday her abdominal pain significantly intensified. Patient was experiencing chills, nausea, diarrhea and did notice blood in her stools yesterday. Patient denies ever having a colonoscopy or following up with GI. Patient states that she has noted her stools to be flat and feels as if at times it is difficult to go. Patient denies any recent fevers, chest pain and vomiting. Patient was following the BRAT diet without symptom relief. Throughout the emergency room evaluation it was noted that her chloride is 109. WBC 11.7. Hemoglobin 15.5. CT abdomen and pelvis shows:   1. Acute uncomplicated diverticulitis in the proximal descending segment. 2.  Interval resolution of previously seen distal descending/proximal sigmoid   diverticulitis. Review of Systems:     Constitutional:  negative for chills, fevers, sweats  Respiratory:  negative for cough, dyspnea on exertion, shortness of breath, wheezing  Cardiovascular:  negative for chest pain, chest pressure/discomfort, lower extremity edema, palpitations  Gastrointestinal:  + for  left lower quadrant abdominal pain-improved than before, denies constipation, diarrhea, + mild nausea, no vomiting  Neurological:  negative for dizziness, headache    Medications: Allergies:     Allergies   Allergen Reactions    Latex     Trazodone Anaphylaxis    Trazodone And Nefazodone        Current Meds:   Scheduled Meds:    vancomycin  250 mg Oral 4 times per day    sodium chloride  80 mL IntraVENous Once    thyroid  60 mg Oral Daily    sodium chloride flush  5-40 mL IntraVENous 2 times per day    enoxaparin  40 mg SubCUTAneous Daily    metroNIDAZOLE  500 mg IntraVENous Q8H    ciprofloxacin  400 mg IntraVENous Q12H     Continuous Infusions:    sodium chloride      sodium chloride Stopped (23)     PRN Meds: sodium chloride flush, sodium chloride flush, sodium chloride, potassium chloride **OR** potassium alternative oral replacement **OR** potassium chloride, magnesium sulfate, ondansetron **OR** ondansetron, acetaminophen **OR** acetaminophen, polyethylene glycol, HYDROmorphone    Data:     Past Medical History:   has a past medical history of Hypothyroidism. Social History:   reports that she has been smoking cigarettes. She has been smoking an average of .25 packs per day. She has never used smokeless tobacco. She reports current drug use. Drug: Marijuana Hudeni Bell). Family History: History reviewed. No pertinent family history. Vitals:  /80   Pulse 63   Temp 98.1 °F (36.7 °C) (Oral)   Resp 18   Ht 5' 2\" (1.575 m)   Wt 201 lb (91.2 kg)   LMP 2022 (Exact Date)   SpO2 94%   BMI 36.76 kg/m²   Temp (24hrs), Av.4 °F (36.9 °C), Min:98.1 °F (36.7 °C), Max:99 °F (37.2 °C)    No results for input(s): POCGLU in the last 72 hours. I/O (24Hr):     Intake/Output Summary (Last 24 hours) at 2023 1108  Last data filed at 2023 1845  Gross per 24 hour   Intake 2332.03 ml   Output --   Net 2332.03 ml         Labs:  Hematology:  Recent Labs     23  0653   WBC 9.3   RBC 4.43   HGB 12.6   HCT 40.4   MCV 91.2   MCH 28.4   MCHC 31.2   RDW 13.7      MPV 9.4       Chemistry:  Recent Labs     23  0653 23  0555     --    K 3.3* 3.7     --    CO2 25  --    GLUCOSE 111*  --    BUN 5*  --    CREATININE 0.76  --    MG 1.8  --    ANIONGAP 10  --    LABGLOM >60  --    CALCIUM 8.9  --    PHOS 2.5*  --        Recent Labs     01/21/23  0653   PROT 6.3*   LABALBU 3.5   AST 16   ALT 15   ALKPHOS 61   BILITOT 1.0   LIPASE 49       ABG:No results found for: POCPH, PHART, PH, POCPCO2, OZK5ONL, PCO2, POCPO2, PO2ART, PO2, POCHCO3, NNS3PFL, HCO3, NBEA, PBEA, BEART, BE, THGBART, THB, MVG3VRK, RGID5MLS, G0UETAVS, O2SAT, FIO2  No results found for: SPECIAL  No results found for: CULTURE    Radiology:  CT ABDOMEN PELVIS W IV CONTRAST Additional Contrast? None    Result Date: 1/20/2023  1. Acute uncomplicated diverticulitis in the proximal descending segment. 2.  Interval resolution of previously seen distal descending/proximal sigmoid diverticulitis.        Physical Examination:        General appearance:  alert, cooperative and no distress, obese  Mental Status:  oriented to person, place and time and normal affect  Lungs:  clear to auscultation bilaterally, normal effort  Heart:  regular rate and rhythm, no murmur  Abdomen:  + Discomfort in descending  colon area , bowel sounds are present  Extremities:  no edema, redness, tenderness in the calves  Skin:  no gross lesions, rashes, induration    Assessment:        Hospital Problems             Last Modified POA    * (Principal) Acute diverticulitis 1/20/2023 Yes    Diverticulosis 1/20/2023 Yes    LLQ abdominal pain 1/20/2023 Yes    Stool culture positive for Clostridioides difficile 1/22/2023 Yes    Hypothyroidism 1/20/2023 Yes     Plan:        Continue oral Cipro for 3 days and Flagyl orally for 7 more days  Low fiber diet for next 3-4 days then switch to high-fiber diet  Discharge home, follow-up with colorectal surgeon/GI as outpatient if she decide for elective colectomy or colonoscopy    Li Olivier MD  1/23/2023  11:08 AM

## 2023-01-23 NOTE — PROGRESS NOTES
Pt discharged to home in good condition with belongings  Discharge instructions given  \"Meds To Beds\" medication at bedside  Pt denies having any further questions at this time  Personal items given to patient at discharge  Patient/family state they have everything they were admitted with.

## 2023-01-23 NOTE — DISCHARGE SUMMARY
[facility]  Office: [address], DO, Roxana Spencer, DO, Wilmer Mail, DO, Mara Has Blood, DO, Roly Ritchie MD, Allyson Palacio MD, Wesley Burrows MD, Katharina Landrum MD,  Jaron Cade MD, Glenny Faust MD, Tae Harris, DO, Risa Miranda MD,  Rod Sutton MD, Kentrell Faust MD, Yrn Wolf, DO, Za Trinh MD, Jermaine Miguel MD, Jazz Memory, DO, Nam Zamora MD, Shelley Norwood MD, Mónica Garza MD, Momo Macedo MD, Riley Herrera, DO, Gomez Garcia MD, Zaid Ley MD, Alejandra Cardenas, CNP,  Melinda Ballard, CNP, Magdiel Dumont, CNP, Sanjuana Izaguirre, CNP,  Niurka Weinstein, [facility], Kirk Mullins, CNP, Bridget Cummings, CNP, Hong Gray, CNP, Donnell Trevino, CNP, Beto Dennis, CNP, Magda Saleem PA-C, Petr Mora, CNS, Trey Gupta, CNP, Pete Crooks, [facility]    Discharge Summary     Patient ID: Ellie De La Fuente  :  1975   MRN: [id number]     ACCOUNT:  [de-identified]   Patient's PCP: KENNETH Johnson CNP  Admit Date: 2023   Discharge Date: 2023     Length of Stay: 3  Code Status:  Full Code  Admitting Physician: Gabriel Max MD  Discharge Physician: Gabriel Max MD     Active Discharge Diagnoses:     Hospital Problem Lists:  Principal Problem:    Acute diverticulitis  Active Problems:    Diverticulosis    LLQ abdominal pain    Stool culture positive for Clostridioides difficile    Hypothyroidism  Resolved Problems:    * No resolved hospital problems. *      Admission Condition:  poor     Discharged Condition: stable    Hospital Stay:   Admitting history:  Patient presents to the emergency room today with complaints of left lower quadrant abdominal pain that radiates to her back. Patient has a significant past medical history of hypothyroidism and diverticulitis.   Patient states that back in December she started to experience left lower quadrant abdominal pain and was treated by her PCP with Cipro and Augmentin however, her pain never fully went away. Yesterday her abdominal pain significantly intensified. Patient was experiencing chills, nausea, diarrhea and did notice blood in her stools yesterday. Patient denies ever having a colonoscopy or following up with GI. Patient states that she has noted her stools to be flat and feels as if at times it is difficult to go. Patient denies any recent fevers, chest pain and vomiting. Patient was following the BRAT diet without symptom relief. Throughout the emergency room evaluation it was noted that her chloride is 109. WBC 11.7. Hemoglobin 15.5. CT abdomen and pelvis shows:   1. Acute uncomplicated diverticulitis in the proximal descending segment. 2.  Interval resolution of previously seen distal descending/proximal sigmoid   diverticulitis.        Hospital Course:   Mild pain in the left lower quadrant only, better than before  Has mild nausea, no vomiting  C. difficile came positive, oral vancomycin was added yesterday  No bowel movement overnight, wants to go home on Flagyl orally, does not want oral vancomycin at discharge  KUB showed nonobstructive bowel gas pattern, no increased stool load  States she had multiple attacks of diverticulitis since 2008, has not seen GI anytime and did not have colonoscopy  Evaluated by GI and colorectal surgery, she has refused colonoscopy ,she will consider outpatient elective colectomy and will follow up with colorectal surgeon for this if decides  Plan:         Continue oral Cipro for 3 days and Flagyl orally for 7 more days  Low fiber diet for next 3-4 days then switch to high-fiber diet  Discharge home, follow-up with colorectal surgeon/GI as outpatient if she decide for elective colectomy or colonoscopy         Significant therapeutic interventions: See above notes    Significant Diagnostic Studies:   Labs / Micro:  CBC:   Lab Results   Component Value Date/Time    WBC 9.3 01/21/2023 06:53 AM    RBC 4.43 01/21/2023 06:53 AM    HGB 12.6 01/21/2023 06:53 AM    HCT 40.4 01/21/2023 06:53 AM    MCV 91.2 01/21/2023 06:53 AM    MCH 28.4 01/21/2023 06:53 AM    MCHC 31.2 01/21/2023 06:53 AM    RDW 13.7 01/21/2023 06:53 AM     01/21/2023 06:53 AM     BMP:    Lab Results   Component Value Date/Time    GLUCOSE 111 01/21/2023 06:53 AM     01/21/2023 06:53 AM    K 3.7 01/23/2023 05:55 AM     01/21/2023 06:53 AM    CO2 25 01/21/2023 06:53 AM    ANIONGAP 10 01/21/2023 06:53 AM    BUN 5 01/21/2023 06:53 AM    CREATININE 0.76 01/21/2023 06:53 AM    BUNCRER 7 01/21/2023 06:53 AM    CALCIUM 8.9 01/21/2023 06:53 AM    LABGLOM >60 01/21/2023 06:53 AM    GFRAA >60 07/29/2022 06:04 AM    GFR      07/29/2022 06:04 AM     HFP:    Lab Results   Component Value Date/Time    PROT 6.3 01/21/2023 06:53 AM     CMP:    Lab Results   Component Value Date/Time    GLUCOSE 111 01/21/2023 06:53 AM     01/21/2023 06:53 AM    K 3.7 01/23/2023 05:55 AM     01/21/2023 06:53 AM    CO2 25 01/21/2023 06:53 AM    BUN 5 01/21/2023 06:53 AM    CREATININE 0.76 01/21/2023 06:53 AM    ANIONGAP 10 01/21/2023 06:53 AM    ALKPHOS 61 01/21/2023 06:53 AM    ALT 15 01/21/2023 06:53 AM    AST 16 01/21/2023 06:53 AM    BILITOT 1.0 01/21/2023 06:53 AM    LABALBU 3.5 01/21/2023 06:53 AM    ALBUMIN 1.4 11/15/2022 03:30 PM    LABGLOM >60 01/21/2023 06:53 AM    GFRAA >60 07/29/2022 06:04 AM    GFR      07/29/2022 06:04 AM    PROT 6.3 01/21/2023 06:53 AM    CALCIUM 8.9 01/21/2023 06:53 AM     PT/INR:    Lab Results   Component Value Date/Time    PROTIME 11.1 11/07/2019 05:18 AM    INR 1.1 11/07/2019 05:18 AM     PTT: No results found for: APTT  FLP:  No results found for: CHOL, TRIG, HDL  U/A:    Lab Results   Component Value Date/Time    COLORU Yellow 01/20/2023 09:40 AM    TURBIDITY SLIGHTLY CLOUDY 01/20/2023 09:40 AM    SPECGRAV 1.008 01/20/2023 09:40 AM    HGBUR TRACE 01/20/2023 09:40 AM PHUR 6.0 01/20/2023 09:40 AM    PROTEINU NEGATIVE 01/20/2023 09:40 AM    GLUCOSEU NEGATIVE 01/20/2023 09:40 AM    KETUA NEGATIVE 01/20/2023 09:40 AM    BILIRUBINUR NEGATIVE 01/20/2023 09:40 AM    UROBILINOGEN Normal 01/20/2023 09:40 AM    NITRU NEGATIVE 01/20/2023 09:40 AM    LEUKOCYTESUR NEGATIVE 01/20/2023 09:40 AM     TSH:    Lab Results   Component Value Date/Time    TSH 0.52 11/15/2022 03:30 PM        Radiology:  XR ABDOMEN (KUB) (SINGLE AP VIEW)    Result Date: 1/22/2023  Nonobstructive bowel gas pattern. No significant increased stool burden. CT ABDOMEN PELVIS W IV CONTRAST Additional Contrast? None    Result Date: 1/20/2023  1. Acute uncomplicated diverticulitis in the proximal descending segment. 2.  Interval resolution of previously seen distal descending/proximal sigmoid diverticulitis. Consultations:    Consults:     Final Specialist Recommendations/Findings:   IP CONSULT TO GI  IP CONSULT TO INTERNAL MEDICINE  IP CONSULT TO GI  IP CONSULT TO COLORECTAL SURGERY      The patient was seen and examined on day of discharge and this discharge summary is in conjunction with any daily progress note from day of discharge. Discharge plan:     Disposition: Home    Physician Follow Up:     Raoul Her MD  [address] [place] [address]  [phone]    Call  To set up appointment for discussion regarding colectomy    Corie Bloom MD  [address] [place] [address]  [phone]    Call  To set up appointment for discussion regarding colectomy    Kirstie Dominique, DO  [address] [place] [id number] [phone]    Call  To set up appointment for discussion regarding colectomy       Requiring Further Evaluation/Follow Up POST HOSPITALIZATION/Incidental Findings:  Follow-up with GI and colorectal surgery as scheduled after finishing antibiotics    Diet:  Low fiber diet for 4 to 5 days then switch to high-fiber diet as tolerated    Activity: As tolerated    Instructions to Patient: Recommended to get colonoscopy after finishing antibiotics as outpatient, patient will think over it and decide    Discharge Medications:      Medication List        START taking these medications      ciprofloxacin 500 MG tablet  Commonly known as: CIPRO  Take 1 tablet by mouth 2 times daily for 3 days     dicyclomine 10 MG capsule  Commonly known as: BENTYL  Take 1 capsule by mouth 3 times daily as needed (pain/cramps)     famotidine 20 MG tablet  Commonly known as: PEPCID  Take 1 tablet by mouth 2 times daily     metroNIDAZOLE 500 MG tablet  Commonly known as: FLAGYL  Take 1 tablet by mouth 3 times daily for 7 days            CONTINUE taking these medications      acetaminophen 500 MG tablet  Commonly known as: TYLENOL  Take 1 tablet by mouth 4 times daily as needed for Pain     thyroid 60 MG tablet  Commonly known as: ARMOUR            STOP taking these medications      pantoprazole 40 MG tablet  Commonly known as: PROTONIX               Where to Get Your Medications        These medications were sent to [address] [place] [address],  [address] [facility] [address]      Phone: [phone]   ciprofloxacin 500 MG tablet  metroNIDAZOLE 500 MG tablet       These medications were sent to [place] [address] #[id number] [facility], [address] [facility] [address] [facility] -  [phone]  [facility], [address] [facility] [address]      Phone: [phone]   dicyclomine 10 MG capsule  famotidine 20 MG tablet         No discharge procedures on file. Time Spent on discharge is  31 mins in patient examination, evaluation, counseling as well as medication reconciliation, prescriptions for required medications, discharge plan and follow up. Electronically signed by   Li Olivier MD  1/23/2023  2:18 PM      Thank you Dr. Manasa Bennett, APRN - CNP for the opportunity to be involved in this patient's care.

## 2023-05-12 ENCOUNTER — HOSPITAL ENCOUNTER (OUTPATIENT)
Age: 48
Setting detail: SPECIMEN
Discharge: HOME | End: 2023-05-12

## 2023-05-12 LAB
25(OH)D3 SERPL-MCNC: 43.1 NG/ML
ALBUMIN SERPL-MCNC: 4.2 G/DL (ref 3.5–5.2)
ALBUMIN/GLOBULIN RATIO: 1.3 (ref 1–2.5)
ALP SERPL-CCNC: 73 U/L (ref 35–104)
ALT SERPL-CCNC: 17 U/L (ref 5–33)
ANION GAP SERPL CALCULATED.3IONS-SCNC: 15 MMOL/L (ref 9–17)
AST SERPL-CCNC: 23 U/L
BILIRUB SERPL-MCNC: 0.7 MG/DL (ref 0.3–1.2)
BUN SERPL-MCNC: 7 MG/DL (ref 6–20)
CALCIUM SERPL-MCNC: 9.8 MG/DL (ref 8.6–10.4)
CHLORIDE SERPL-SCNC: 105 MMOL/L (ref 98–107)
CO2 SERPL-SCNC: 21 MMOL/L (ref 20–31)
CREAT SERPL-MCNC: 0.76 MG/DL (ref 0.5–0.9)
EST. AVERAGE GLUCOSE BLD GHB EST-MCNC: 105 MG/DL
GFR SERPL CREATININE-BSD FRML MDRD: >60 ML/MIN/1.73M2
GLUCOSE SERPL-MCNC: 95 MG/DL (ref 70–99)
HBA1C MFR BLD: 5.3 % (ref 4–6)
POTASSIUM SERPL-SCNC: 4.2 MMOL/L (ref 3.7–5.3)
PROT SERPL-MCNC: 7.4 G/DL (ref 6.4–8.3)
SODIUM SERPL-SCNC: 141 MMOL/L (ref 135–144)
T3FREE SERPL-MCNC: 3.03 PG/ML (ref 2.02–4.43)
TSH SERPL-ACNC: 1.43 UIU/ML (ref 0.3–5)

## 2023-10-27 ENCOUNTER — OFFICE VISIT (OUTPATIENT)
Dept: PRIMARY CARE CLINIC | Age: 48
End: 2023-10-27
Payer: COMMERCIAL

## 2023-10-27 ENCOUNTER — HOSPITAL ENCOUNTER (OUTPATIENT)
Age: 48
Setting detail: SPECIMEN
Discharge: HOME | End: 2023-10-27

## 2023-10-27 VITALS
OXYGEN SATURATION: 99 % | BODY MASS INDEX: 36.99 KG/M2 | HEIGHT: 62 IN | HEART RATE: 101 BPM | WEIGHT: 201 LBS | DIASTOLIC BLOOD PRESSURE: 83 MMHG | TEMPERATURE: 98.6 F | SYSTOLIC BLOOD PRESSURE: 136 MMHG

## 2023-10-27 DIAGNOSIS — R10.9 FLANK PAIN: Primary | ICD-10-CM

## 2023-10-27 DIAGNOSIS — R10.9 FLANK PAIN: ICD-10-CM

## 2023-10-27 LAB
BILIRUBIN, POC: NEGATIVE
BLOOD URINE, POC: NORMAL
CLARITY, POC: CLEAR
COLOR, POC: YELLOW
GLUCOSE URINE, POC: NEGATIVE
KETONES, POC: NEGATIVE
LEUKOCYTE EST, POC: NEGATIVE
NITRITE, POC: NEGATIVE
PH, POC: 6.5
PROTEIN, POC: NEGATIVE
SPECIFIC GRAVITY, POC: 1.01
UROBILINOGEN, POC: 0.2

## 2023-10-27 PROCEDURE — 81002 URINALYSIS NONAUTO W/O SCOPE: CPT | Performed by: FAMILY MEDICINE

## 2023-10-27 PROCEDURE — 99213 OFFICE O/P EST LOW 20 MIN: CPT | Performed by: FAMILY MEDICINE

## 2023-10-27 PROCEDURE — 81025 URINE PREGNANCY TEST: CPT | Performed by: FAMILY MEDICINE

## 2023-10-27 ASSESSMENT — ENCOUNTER SYMPTOMS
BOWEL INCONTINENCE: 0
BACK PAIN: 1

## 2023-10-27 NOTE — PATIENT INSTRUCTIONS
Drink plenty of fluids and stay hydrated  Urine in office shows blood but otherwise negative for infection  Will send urine for culture and call with results  Have CT abdomen to rule out kidney stone.  If unable to schedule as an outpatient then may need to go to ER for imaging  If symptoms do not improve please follow-up with PCP or return to clinic  If symptoms worsen then go to the ER

## 2023-10-27 NOTE — PROGRESS NOTES
facility-administered medications for this visit. Allergies   Allergen Reactions    Latex     Trazodone Anaphylaxis    Trazodone And Nefazodone        Health Maintenance   Topic Date Due    Hepatitis B vaccine (1 of 3 - 3-dose series) Never done    COVID-19 Vaccine (1) Never done    Pneumococcal 0-64 years Vaccine (1 - PCV) Never done    Depression Screen  Never done    HIV screen  Never done    Hepatitis C screen  Never done    DTaP/Tdap/Td vaccine (1 - Tdap) Never done    Cervical cancer screen  Never done    Lipids  Never done    Colorectal Cancer Screen  Never done    Flu vaccine (1) Never done    Hepatitis A vaccine  Aged Out    Hib vaccine  Aged Out    Meningococcal (ACWY) vaccine  Aged Out    A1C test (Diabetic or Prediabetic)  Discontinued       :      Review of Systems   Constitutional:  Negative for fever. Gastrointestinal:  Negative for bowel incontinence. Genitourinary:  Positive for flank pain. Negative for bladder incontinence, dysuria, hematuria, pelvic pain and vaginal discharge. Musculoskeletal:  Positive for back pain. Objective:     Physical Exam  Vitals and nursing note reviewed. Constitutional:       Appearance: Normal appearance. She is obese. HENT:      Head: Normocephalic and atraumatic. Right Ear: Hearing normal.      Left Ear: Hearing normal.      Mouth/Throat:      Lips: Pink. Cardiovascular:      Rate and Rhythm: Normal rate. Pulmonary:      Effort: Pulmonary effort is normal.   Abdominal:      General: There is no distension. Palpations: Abdomen is soft. There is no mass. Tenderness: There is abdominal tenderness. There is left CVA tenderness. There is no rebound. Hernia: No hernia is present. Musculoskeletal:      Cervical back: Normal range of motion. No muscular tenderness. Skin:     General: Skin is warm and dry. Neurological:      Mental Status: She is alert and oriented to person, place, and time. Mental status is at baseline.

## 2023-10-28 LAB
MICROORGANISM SPEC CULT: NO GROWTH
SPECIMEN DESCRIPTION: NORMAL

## 2023-11-08 ENCOUNTER — HOSPITAL ENCOUNTER (OUTPATIENT)
Age: 48
Setting detail: SPECIMEN
Discharge: HOME | End: 2023-11-08

## 2023-11-08 LAB
25(OH)D3 SERPL-MCNC: 31.9 NG/ML (ref 30–100)
ALBUMIN SERPL-MCNC: 4.3 G/DL (ref 3.5–5.2)
ALBUMIN/GLOB SERPL: 1 {RATIO} (ref 1–2.5)
ALP SERPL-CCNC: 66 U/L (ref 35–104)
ALT SERPL-CCNC: 17 U/L (ref 10–35)
ANION GAP SERPL CALCULATED.3IONS-SCNC: 12 MMOL/L (ref 9–16)
AST SERPL-CCNC: 24 U/L (ref 10–35)
BILIRUB SERPL-MCNC: 0.4 MG/DL (ref 0–1.2)
BUN SERPL-MCNC: 9 MG/DL (ref 6–20)
CALCIUM SERPL-MCNC: 9.2 MG/DL (ref 8.6–10.4)
CHLORIDE SERPL-SCNC: 106 MMOL/L (ref 98–107)
CO2 SERPL-SCNC: 23 MMOL/L (ref 20–31)
CREAT SERPL-MCNC: 0.8 MG/DL (ref 0.5–0.9)
EST. AVERAGE GLUCOSE BLD GHB EST-MCNC: 114 MG/DL
GFR SERPL CREATININE-BSD FRML MDRD: >60 ML/MIN/1.73M2
GLUCOSE SERPL-MCNC: 93 MG/DL (ref 74–99)
HBA1C MFR BLD: 5.6 % (ref 4–6)
POTASSIUM SERPL-SCNC: 4.1 MMOL/L (ref 3.7–5.3)
PROT SERPL-MCNC: 7.4 G/DL (ref 6.6–8.7)
SODIUM SERPL-SCNC: 141 MMOL/L (ref 136–145)
T3FREE SERPL-MCNC: 4.87 PG/ML (ref 2.02–4.43)
T4 FREE SERPL-MCNC: 1 NG/DL (ref 0.93–1.7)
TSH SERPL DL<=0.05 MIU/L-ACNC: 3.77 UIU/ML (ref 0.27–4.2)

## 2024-05-09 ENCOUNTER — HOSPITAL ENCOUNTER (OUTPATIENT)
Age: 49
Setting detail: SPECIMEN
Discharge: HOME | End: 2024-05-09

## 2024-05-09 LAB
25(OH)D3 SERPL-MCNC: 28.4 NG/ML (ref 30–100)
EST. AVERAGE GLUCOSE BLD GHB EST-MCNC: 111 MG/DL
HBA1C MFR BLD: 5.5 % (ref 4–6)
T3 SERPL-MCNC: 252 NG/DL (ref 80–200)
T4 FREE SERPL-MCNC: 1.1 NG/DL (ref 0.92–1.68)
TSH SERPL DL<=0.05 MIU/L-ACNC: 0.35 UIU/ML (ref 0.27–4.2)

## 2024-12-09 ENCOUNTER — HOSPITAL ENCOUNTER (OUTPATIENT)
Age: 49
Discharge: HOME | End: 2024-12-09
Payer: COMMERCIAL

## 2024-12-09 ENCOUNTER — HOSPITAL ENCOUNTER (OUTPATIENT)
Dept: CT IMAGING | Age: 49
Discharge: HOME | End: 2024-12-11
Payer: COMMERCIAL

## 2024-12-09 ENCOUNTER — OFFICE VISIT (OUTPATIENT)
Dept: FAMILY MEDICINE CLINIC | Age: 49
End: 2024-12-09

## 2024-12-09 VITALS
SYSTOLIC BLOOD PRESSURE: 120 MMHG | TEMPERATURE: 97.6 F | OXYGEN SATURATION: 97 % | HEIGHT: 62 IN | HEART RATE: 95 BPM | WEIGHT: 170.2 LBS | DIASTOLIC BLOOD PRESSURE: 84 MMHG | BODY MASS INDEX: 31.32 KG/M2

## 2024-12-09 DIAGNOSIS — Z12.4 CERVICAL CANCER SCREENING: ICD-10-CM

## 2024-12-09 DIAGNOSIS — E03.9 HYPOTHYROIDISM, UNSPECIFIED TYPE: Primary | ICD-10-CM

## 2024-12-09 DIAGNOSIS — E03.9 HYPOTHYROIDISM, UNSPECIFIED TYPE: ICD-10-CM

## 2024-12-09 DIAGNOSIS — Z13.1 DIABETES MELLITUS SCREENING: ICD-10-CM

## 2024-12-09 DIAGNOSIS — K57.90 DIVERTICULOSIS: ICD-10-CM

## 2024-12-09 DIAGNOSIS — E55.9 VITAMIN D DEFICIENCY: ICD-10-CM

## 2024-12-09 DIAGNOSIS — Z13.220 LIPID SCREENING: ICD-10-CM

## 2024-12-09 DIAGNOSIS — Z12.31 BREAST CANCER SCREENING BY MAMMOGRAM: ICD-10-CM

## 2024-12-09 DIAGNOSIS — K57.92 DIVERTICULITIS: ICD-10-CM

## 2024-12-09 DIAGNOSIS — E53.8 VITAMIN B12 DEFICIENCY: ICD-10-CM

## 2024-12-09 DIAGNOSIS — M54.50 ACUTE RIGHT-SIDED LOW BACK PAIN WITHOUT SCIATICA: Primary | ICD-10-CM

## 2024-12-09 LAB
25(OH)D3 SERPL-MCNC: 25.4 NG/ML (ref 30–100)
ALBUMIN SERPL-MCNC: 4.7 G/DL (ref 3.5–5.2)
ALBUMIN/GLOB SERPL: 1.5 {RATIO} (ref 1–2.5)
ALP SERPL-CCNC: 71 U/L (ref 35–104)
ALT SERPL-CCNC: 20 U/L (ref 10–35)
ANION GAP SERPL CALCULATED.3IONS-SCNC: 11 MMOL/L (ref 9–16)
AST SERPL-CCNC: 29 U/L (ref 10–35)
BILIRUB SERPL-MCNC: 1 MG/DL (ref 0–1.2)
BILIRUBIN, POC: NORMAL
BLOOD URINE, POC: NORMAL
BUN SERPL-MCNC: 7 MG/DL (ref 6–20)
CALCIUM SERPL-MCNC: 10.3 MG/DL (ref 8.6–10.4)
CHLORIDE SERPL-SCNC: 105 MMOL/L (ref 98–107)
CLARITY, POC: CLEAR
CO2 SERPL-SCNC: 25 MMOL/L (ref 20–31)
COLOR, POC: NORMAL
CREAT SERPL-MCNC: 0.9 MG/DL (ref 0.6–0.9)
ERYTHROCYTE [DISTWIDTH] IN BLOOD BY AUTOMATED COUNT: 12.9 % (ref 11.8–14.4)
GFR, ESTIMATED: 79 ML/MIN/1.73M2
GLUCOSE SERPL-MCNC: 100 MG/DL (ref 74–99)
GLUCOSE URINE, POC: NEGATIVE MG/DL
HCG UR QL: NEGATIVE
HCT VFR BLD AUTO: 46.3 % (ref 36.3–47.1)
HGB BLD-MCNC: 15 G/DL (ref 11.9–15.1)
KETONES, POC: NORMAL MG/DL
LEUKOCYTE EST, POC: NEGATIVE
MCH RBC QN AUTO: 29.7 PG (ref 25.2–33.5)
MCHC RBC AUTO-ENTMCNC: 32.4 G/DL (ref 28.4–34.8)
MCV RBC AUTO: 91.7 FL (ref 82.6–102.9)
NITRITE, POC: NEGATIVE
NRBC BLD-RTO: 0 PER 100 WBC
PH, POC: 5.5
PLATELET # BLD AUTO: 333 K/UL (ref 138–453)
PMV BLD AUTO: 9.8 FL (ref 8.1–13.5)
POTASSIUM SERPL-SCNC: 4 MMOL/L (ref 3.7–5.3)
PROT SERPL-MCNC: 7.9 G/DL (ref 6.6–8.7)
PROTEIN, POC: NEGATIVE MG/DL
RBC # BLD AUTO: 5.05 M/UL (ref 3.95–5.11)
SODIUM SERPL-SCNC: 141 MMOL/L (ref 136–145)
SPECIFIC GRAVITY, POC: 1.02
T3 SERPL-MCNC: 136 NG/DL (ref 80–200)
T4 FREE SERPL-MCNC: 1 NG/DL (ref 0.92–1.68)
TSH SERPL DL<=0.05 MIU/L-ACNC: 3.64 UIU/ML (ref 0.27–4.2)
UROBILINOGEN, POC: 1 MG/DL
VIT B12 SERPL-MCNC: 802 PG/ML (ref 232–1245)
WBC OTHER # BLD: 7 K/UL (ref 3.5–11.3)

## 2024-12-09 PROCEDURE — 82306 VITAMIN D 25 HYDROXY: CPT

## 2024-12-09 PROCEDURE — 36415 COLL VENOUS BLD VENIPUNCTURE: CPT

## 2024-12-09 PROCEDURE — 85027 COMPLETE CBC AUTOMATED: CPT

## 2024-12-09 PROCEDURE — 74177 CT ABD & PELVIS W/CONTRAST: CPT

## 2024-12-09 PROCEDURE — 82607 VITAMIN B-12: CPT

## 2024-12-09 PROCEDURE — 84443 ASSAY THYROID STIM HORMONE: CPT

## 2024-12-09 PROCEDURE — 6360000004 HC RX CONTRAST MEDICATION: Performed by: NURSE PRACTITIONER

## 2024-12-09 PROCEDURE — 84439 ASSAY OF FREE THYROXINE: CPT

## 2024-12-09 PROCEDURE — 80053 COMPREHEN METABOLIC PANEL: CPT

## 2024-12-09 PROCEDURE — 2580000003 HC RX 258: Performed by: NURSE PRACTITIONER

## 2024-12-09 PROCEDURE — 81025 URINE PREGNANCY TEST: CPT

## 2024-12-09 PROCEDURE — 2500000003 HC RX 250 WO HCPCS: Performed by: NURSE PRACTITIONER

## 2024-12-09 PROCEDURE — 84480 ASSAY TRIIODOTHYRONINE (T3): CPT

## 2024-12-09 RX ORDER — 0.9 % SODIUM CHLORIDE 0.9 %
100 INTRAVENOUS SOLUTION INTRAVENOUS ONCE
Status: COMPLETED | OUTPATIENT
Start: 2024-12-09 | End: 2024-12-09

## 2024-12-09 RX ORDER — THYROID 60 MG/1
60 TABLET ORAL DAILY
Qty: 90 TABLET | Refills: 1 | Status: SHIPPED | OUTPATIENT
Start: 2024-12-09 | End: 2024-12-10 | Stop reason: SDUPTHER

## 2024-12-09 RX ORDER — IOPAMIDOL 755 MG/ML
75 INJECTION, SOLUTION INTRAVASCULAR
Status: COMPLETED | OUTPATIENT
Start: 2024-12-09 | End: 2024-12-09

## 2024-12-09 RX ORDER — SODIUM CHLORIDE 0.9 % (FLUSH) 0.9 %
10 SYRINGE (ML) INJECTION PRN
Status: DISCONTINUED | OUTPATIENT
Start: 2024-12-09 | End: 2024-12-12 | Stop reason: HOSPADM

## 2024-12-09 RX ADMIN — BARIUM SULFATE 450 ML: 20 SUSPENSION ORAL at 13:25

## 2024-12-09 RX ADMIN — SODIUM CHLORIDE, PRESERVATIVE FREE 10 ML: 5 INJECTION INTRAVENOUS at 13:25

## 2024-12-09 RX ADMIN — IOPAMIDOL 75 ML: 755 INJECTION, SOLUTION INTRAVENOUS at 13:24

## 2024-12-09 RX ADMIN — SODIUM CHLORIDE 100 ML: 9 INJECTION, SOLUTION INTRAVENOUS at 13:25

## 2024-12-09 SDOH — ECONOMIC STABILITY: FOOD INSECURITY: WITHIN THE PAST 12 MONTHS, YOU WORRIED THAT YOUR FOOD WOULD RUN OUT BEFORE YOU GOT MONEY TO BUY MORE.: NEVER TRUE

## 2024-12-09 SDOH — ECONOMIC STABILITY: FOOD INSECURITY: WITHIN THE PAST 12 MONTHS, THE FOOD YOU BOUGHT JUST DIDN'T LAST AND YOU DIDN'T HAVE MONEY TO GET MORE.: NEVER TRUE

## 2024-12-09 SDOH — ECONOMIC STABILITY: INCOME INSECURITY: HOW HARD IS IT FOR YOU TO PAY FOR THE VERY BASICS LIKE FOOD, HOUSING, MEDICAL CARE, AND HEATING?: NOT HARD AT ALL

## 2024-12-09 ASSESSMENT — ENCOUNTER SYMPTOMS
SHORTNESS OF BREATH: 0
SORE THROAT: 0
DIARRHEA: 0
BACK PAIN: 1
ABDOMINAL PAIN: 1
SINUS PAIN: 0
VOMITING: 0
NAUSEA: 0
COUGH: 0

## 2024-12-09 ASSESSMENT — PATIENT HEALTH QUESTIONNAIRE - PHQ9
SUM OF ALL RESPONSES TO PHQ QUESTIONS 1-9: 0
1. LITTLE INTEREST OR PLEASURE IN DOING THINGS: NOT AT ALL
SUM OF ALL RESPONSES TO PHQ9 QUESTIONS 1 & 2: 0
SUM OF ALL RESPONSES TO PHQ QUESTIONS 1-9: 0
SUM OF ALL RESPONSES TO PHQ QUESTIONS 1-9: 0
2. FEELING DOWN, DEPRESSED OR HOPELESS: NOT AT ALL
SUM OF ALL RESPONSES TO PHQ QUESTIONS 1-9: 0

## 2024-12-09 NOTE — PROGRESS NOTES
Visit Information    Have you changed or started any medications since your last visit including any over-the-counter medicines, vitamins, or herbal medicines? no   Have you stopped taking any of your medications? Is so, why? -  no  Are you having any side effects from any of your medications? - no    Have you seen any other physician or provider since your last visit?  no   Have you had any other diagnostic tests since your last visit?  no   Have you been seen in the emergency room and/or had an admission in a hospital since we last saw you?  no   Have you had your routine dental cleaning in the past 6 months?  no     Do you have an active MyChart account? If no, what is the barrier?  Yes    Patient Care Team:  Chapito Nicole APRN - CNP as PCP - General (Certified Nurse Practitioner)    Medical History Review  Past Medical, Family, and Social History reviewed and  contribute to the patient presenting condition    Health Maintenance   Topic Date Due    Pneumococcal 0-64 years Vaccine (1 of 2 - PCV) Never done    Depression Screen  Never done    HIV screen  Never done    Hepatitis C screen  Never done    Hepatitis B vaccine (1 of 3 - 19+ 3-dose series) Never done    DTaP/Tdap/Td vaccine (1 - Tdap) Never done    Cervical cancer screen  Never done    Lipids  Never done    Breast cancer screen  Never done    Colorectal Cancer Screen  Never done    Flu vaccine (1) Never done    COVID-19 Vaccine (1 - 2023-24 season) Never done    Hepatitis A vaccine  Aged Out    Hib vaccine  Aged Out    Polio vaccine  Aged Out    Meningococcal (ACWY) vaccine  Aged Out    A1C test (Diabetic or Prediabetic)  Discontinued

## 2024-12-09 NOTE — PATIENT INSTRUCTIONS
Low Back Pain: Exercises  Introduction  Here are some examples of exercises for you to try. The exercises may be suggested for a condition or for rehabilitation. Start each exercise slowly. Ease off the exercises if you start to have pain.  You will be told when to start these exercises and which ones will work best for you.  How to do the exercises  Hamstring stretch in a doorway    Sit on the floor close to a doorway. Be sure to stretch your affected leg first.  Lie down with your other leg through the doorway.  Slide your affected leg up the wall to straighten your knee. Don't point your toes. You should feel a gentle stretch down the back of your leg. Be sure to:  Hold the stretch for at least 1 minute. Then over time, try to lengthen the time you hold the stretch to as long as 6 minutes.  Repeat 2 to 4 times.  It's a good idea to repeat these steps with your other leg.  To stretch your right leg, scoot to the right side of the doorway.  To stretch your left leg, scoot to the left side of the doorway.  Keep both knees straight.  Keep your back flat and your other heel on the floor.  If you do not have a place to do this exercise in a doorway, there is another way to do it:  Lie on your back, and bend the knee of your affected leg.  Loop a towel under the ball and toes of that foot, and hold the ends of the towel in your hands.  Straighten your knee as you raise that foot into the air. Slowly pull back on the towel. You should feel a gentle stretch down the back of your leg.  Hold the stretch for 15 to 30 seconds. Or even better, hold the stretch for 1 minute if you can.  Repeat 2 to 4 times.  It's a good idea to repeat these steps with your other leg.  Single knee-to-chest stretch    Lie on your back with your knees bent and your feet flat on the floor. You can put a small pillow under your head and neck if it is more comfortable.  Clasp your hands under one knee and bring the knee to your chest. Keep your

## 2024-12-09 NOTE — PROGRESS NOTES
[facility]  [facility]  [address] [facility] [address]  Dept: [phone]  Dept Fax: [phone]    Arnulfo Flor is a 49 y.o. female who presents today for her medicalconditions/complaints as noted below.  Arnulfo Flor is c/o of Lower Back Pain (Low right sided back pain. No injury. X's 2 weeks. Pain is getting better. ) and New Patient      HPI:     48 yo female presents for new patient    Concerns with right low back pain.  Patient describes that she has had low back pain for the past 2 to 3 weeks.  Patient reports there was no injury or trauma but does report that she was painting cabinets.  Additionally patient does have a history of recurrent diverticulitis.  Patient has been taking natural antibiotics oral of oregano.  Does have a history of kidney stones.    Hashimoto's hypothyroidism, currently managed with [place] [facility].  Last labs were drawn in May 7 months prior.  There is normal TSH, normal T4, elevated T3.    History of vitamin D D deficiency    History of vitamin B12 deficiency    Due for pap    Due for leonarda    Declines colonoscopy        Past Medical History:   Diagnosis Date    Diverticular disease     Hashimoto's disease     Hypothyroidism         Current Outpatient Medications   Medication Sig Dispense Refill    thyroid (ARMOUR) 60 MG tablet Take 1 tablet by mouth daily      famotidine (PEPCID) 20 MG tablet Take 1 tablet by mouth 2 times daily (Patient not taking: Reported on 12/9/2024) 60 tablet 0    dicyclomine (BENTYL) 10 MG capsule Take 1 capsule by mouth 3 times daily as needed (pain/cramps) (Patient not taking: Reported on 12/9/2024) 15 capsule 1    acetaminophen (TYLENOL) 500 MG tablet Take 1 tablet by mouth 4 times daily as needed for Pain 20 tablet 0     No current facility-administered medications for this visit.     Allergies   Allergen Reactions    Latex     Trazodone Anaphylaxis    Trazodone And Nefazodone        Subjective:      Review of

## 2024-12-10 DIAGNOSIS — E03.9 HYPOTHYROIDISM, UNSPECIFIED TYPE: ICD-10-CM

## 2024-12-10 DIAGNOSIS — K57.92 DIVERTICULITIS: ICD-10-CM

## 2024-12-10 RX ORDER — THYROID 60 MG/1
60 TABLET ORAL DAILY
Qty: 90 TABLET | Refills: 1 | Status: SHIPPED | OUTPATIENT
Start: 2024-12-10

## 2024-12-28 ENCOUNTER — PATIENT MESSAGE (OUTPATIENT)
Dept: FAMILY MEDICINE CLINIC | Age: 49
End: 2024-12-28

## 2024-12-28 DIAGNOSIS — E03.9 HYPOTHYROIDISM, UNSPECIFIED TYPE: ICD-10-CM

## 2024-12-30 RX ORDER — THYROID,PORK 60 MG
60 TABLET ORAL DAILY
Qty: 90 TABLET | Refills: 1 | Status: SHIPPED | OUTPATIENT
Start: 2024-12-30 | End: 2024-12-31 | Stop reason: SDUPTHER

## 2024-12-31 RX ORDER — THYROID,PORK 60 MG
60 TABLET ORAL DAILY
Qty: 90 TABLET | Refills: 1 | Status: SHIPPED | OUTPATIENT
Start: 2024-12-31